# Patient Record
Sex: FEMALE | Race: WHITE | NOT HISPANIC OR LATINO | Employment: UNEMPLOYED | URBAN - NONMETROPOLITAN AREA
[De-identification: names, ages, dates, MRNs, and addresses within clinical notes are randomized per-mention and may not be internally consistent; named-entity substitution may affect disease eponyms.]

---

## 2023-03-07 ENCOUNTER — OFFICE VISIT (OUTPATIENT)
Dept: MEDICAL GROUP | Facility: PHYSICIAN GROUP | Age: 58
End: 2023-03-07
Payer: COMMERCIAL

## 2023-03-07 VITALS
RESPIRATION RATE: 16 BRPM | TEMPERATURE: 96.8 F | SYSTOLIC BLOOD PRESSURE: 118 MMHG | WEIGHT: 101 LBS | DIASTOLIC BLOOD PRESSURE: 90 MMHG | OXYGEN SATURATION: 100 % | HEART RATE: 68 BPM

## 2023-03-07 DIAGNOSIS — R41.3 MEMORY DISORDER: ICD-10-CM

## 2023-03-07 DIAGNOSIS — F99 INSOMNIA DUE TO OTHER MENTAL DISORDER: ICD-10-CM

## 2023-03-07 DIAGNOSIS — F51.05 INSOMNIA DUE TO OTHER MENTAL DISORDER: ICD-10-CM

## 2023-03-07 DIAGNOSIS — Z12.11 SCREENING FOR COLORECTAL CANCER: ICD-10-CM

## 2023-03-07 DIAGNOSIS — F33.1 MODERATE EPISODE OF RECURRENT MAJOR DEPRESSIVE DISORDER (HCC): ICD-10-CM

## 2023-03-07 DIAGNOSIS — Z00.00 ANNUAL PHYSICAL EXAM: ICD-10-CM

## 2023-03-07 DIAGNOSIS — B00.9 HERPES SIMPLEX: ICD-10-CM

## 2023-03-07 DIAGNOSIS — F33.41 MAJOR DEPRESSIVE DISORDER, RECURRENT EPISODE, IN PARTIAL REMISSION (HCC): ICD-10-CM

## 2023-03-07 DIAGNOSIS — F50.02 ANOREXIA NERVOSA, BINGE-EATING PURGING TYPE: ICD-10-CM

## 2023-03-07 DIAGNOSIS — Z12.12 SCREENING FOR COLORECTAL CANCER: ICD-10-CM

## 2023-03-07 PROBLEM — G47.00 INSOMNIA: Status: ACTIVE | Noted: 2023-03-07

## 2023-03-07 PROBLEM — F50.029 ANOREXIA NERVOSA, BINGE-EATING PURGING TYPE: Status: ACTIVE | Noted: 2021-12-27

## 2023-03-07 PROBLEM — F33.9 MAJOR DEPRESSIVE DISORDER, RECURRENT EPISODE (HCC): Status: ACTIVE | Noted: 2022-01-26

## 2023-03-07 PROCEDURE — 99204 OFFICE O/P NEW MOD 45 MIN: CPT | Performed by: NURSE PRACTITIONER

## 2023-03-07 RX ORDER — VENLAFAXINE HYDROCHLORIDE 150 MG/1
300 CAPSULE, EXTENDED RELEASE ORAL DAILY
Qty: 90 CAPSULE | Refills: 0 | Status: SHIPPED | OUTPATIENT
Start: 2023-03-07 | End: 2023-11-13 | Stop reason: SDUPTHER

## 2023-03-07 RX ORDER — GINSENG 100 MG
1 CAPSULE ORAL 2 TIMES DAILY
COMMUNITY
Start: 2022-03-09 | End: 2024-01-07

## 2023-03-07 RX ORDER — GABAPENTIN 600 MG/1
600 TABLET ORAL DAILY
Qty: 90 TABLET | Refills: 0 | Status: SHIPPED | OUTPATIENT
Start: 2023-03-07 | End: 2023-11-13 | Stop reason: SDUPTHER

## 2023-03-07 RX ORDER — CLONAZEPAM 1 MG/1
TABLET ORAL
COMMUNITY
Start: 2023-02-13 | End: 2023-11-13 | Stop reason: SDUPTHER

## 2023-03-07 RX ORDER — VENLAFAXINE HYDROCHLORIDE 150 MG/1
300 CAPSULE, EXTENDED RELEASE ORAL
COMMUNITY
Start: 2022-02-03 | End: 2023-03-07

## 2023-03-07 RX ORDER — GABAPENTIN 600 MG/1
1 TABLET ORAL 2 TIMES DAILY
COMMUNITY
Start: 2022-02-03 | End: 2023-03-07 | Stop reason: SDUPTHER

## 2023-03-07 RX ORDER — VENLAFAXINE HYDROCHLORIDE 150 MG/1
300 CAPSULE, EXTENDED RELEASE ORAL DAILY
COMMUNITY
Start: 2023-02-13 | End: 2023-03-07 | Stop reason: SDUPTHER

## 2023-03-07 RX ORDER — GABAPENTIN 600 MG/1
TABLET ORAL
COMMUNITY
Start: 2023-02-17 | End: 2023-03-07

## 2023-03-07 RX ORDER — MIRTAZAPINE 45 MG/1
45 TABLET, FILM COATED ORAL
COMMUNITY
Start: 2023-02-10 | End: 2023-11-13 | Stop reason: SDUPTHER

## 2023-03-07 RX ORDER — MIRTAZAPINE 45 MG/1
45 TABLET, FILM COATED ORAL
COMMUNITY
Start: 2022-02-03 | End: 2023-03-07

## 2023-03-07 ASSESSMENT — ENCOUNTER SYMPTOMS
VOMITING: 0
INSOMNIA: 1
FEVER: 0
MEMORY LOSS: 1
HEADACHES: 0
NERVOUS/ANXIOUS: 1
CHILLS: 0
DIZZINESS: 0
EYES NEGATIVE: 1
DEPRESSION: 1
SHORTNESS OF BREATH: 0
NAUSEA: 0

## 2023-03-07 ASSESSMENT — PATIENT HEALTH QUESTIONNAIRE - PHQ9
SUM OF ALL RESPONSES TO PHQ QUESTIONS 1-9: 13
5. POOR APPETITE OR OVEREATING: 0 - NOT AT ALL
CLINICAL INTERPRETATION OF PHQ2 SCORE: 6
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
## 2023-03-07 NOTE — ASSESSMENT & PLAN NOTE
Patient currently takes 600 mg gabapentin nightly, Remeron 45 mg nightly to help with her sleep.

## 2023-03-24 ENCOUNTER — OFFICE VISIT (OUTPATIENT)
Dept: MEDICAL GROUP | Facility: PHYSICIAN GROUP | Age: 58
End: 2023-03-24
Payer: COMMERCIAL

## 2023-03-24 VITALS
BODY MASS INDEX: 16.96 KG/M2 | OXYGEN SATURATION: 96 % | WEIGHT: 101.8 LBS | SYSTOLIC BLOOD PRESSURE: 112 MMHG | DIASTOLIC BLOOD PRESSURE: 60 MMHG | HEIGHT: 65 IN | RESPIRATION RATE: 12 BRPM | TEMPERATURE: 96.9 F | HEART RATE: 75 BPM

## 2023-03-24 DIAGNOSIS — B00.9 HERPES SIMPLEX: ICD-10-CM

## 2023-03-24 DIAGNOSIS — H54.7 VISION IMPAIRMENT: ICD-10-CM

## 2023-03-24 DIAGNOSIS — E55.9 VITAMIN D DEFICIENCY: ICD-10-CM

## 2023-03-24 DIAGNOSIS — F33.41 MAJOR DEPRESSIVE DISORDER, RECURRENT EPISODE, IN PARTIAL REMISSION (HCC): ICD-10-CM

## 2023-03-24 DIAGNOSIS — Z87.440 HISTORY OF RECURRENT UTIS: ICD-10-CM

## 2023-03-24 DIAGNOSIS — Z13.820 SCREENING FOR OSTEOPOROSIS: ICD-10-CM

## 2023-03-24 DIAGNOSIS — F33.0 MILD EPISODE OF RECURRENT MAJOR DEPRESSIVE DISORDER (HCC): ICD-10-CM

## 2023-03-24 DIAGNOSIS — F50.02 ANOREXIA NERVOSA, BINGE-EATING PURGING TYPE: ICD-10-CM

## 2023-03-24 PROCEDURE — 99214 OFFICE O/P EST MOD 30 MIN: CPT | Performed by: NURSE PRACTITIONER

## 2023-03-24 RX ORDER — ACYCLOVIR 400 MG/1
400 TABLET ORAL 2 TIMES DAILY
Qty: 90 TABLET | Refills: 0 | Status: SHIPPED | OUTPATIENT
Start: 2023-03-24 | End: 2024-01-07

## 2023-03-24 RX ORDER — GABAPENTIN 300 MG/1
CAPSULE ORAL
COMMUNITY
Start: 2023-03-15 | End: 2023-03-24

## 2023-03-27 ASSESSMENT — ENCOUNTER SYMPTOMS
FLANK PAIN: 0
FEVER: 0
SHORTNESS OF BREATH: 0
CHILLS: 0

## 2023-03-27 NOTE — PROGRESS NOTES
"CC:  Chief Complaint   Patient presents with    Lab Results       HISTORY OF PRESENT ILLNESS: Patient is a 57 y.o. female established patient presenting     Problem   History of Recurrent Utis    Gets after sex often  Notes she is compliant with hygiene after intercourse but continues to get them         Vitamin D Deficiency    New labs completed 3/21/2023   Vitamin d level 16.4  Not currently on any supplementation      Anorexia Nervosa, Binge-Eating Purging Type    Requesting new referral for behavioral health. She is still following with her psych but would like someone closer.     3/7/2023  History of since she was 16 yo  Has been in and out of eating disorder clinics  Is physically active 5 times a week  Notes she purges 7 nights a week but will eat after purging           Review of Systems   Constitutional:  Negative for chills and fever.   Eyes:         Sometimes get blurred vision or out of focus with certain distances   Respiratory:  Negative for shortness of breath.    Cardiovascular:  Negative for chest pain.   Genitourinary:  Negative for dysuria, flank pain, frequency, hematuria and urgency.           - NOTE: All other systems reviewed and are negative, except as in HPI.      Exam:    /60 (BP Location: Left arm, Patient Position: Sitting, BP Cuff Size: Adult)   Pulse 75   Temp 36.1 °C (96.9 °F) (Temporal)   Resp 12   Ht 1.651 m (5' 5\")   Wt 46.2 kg (101 lb 12.8 oz)   SpO2 96%  Body mass index is 16.94 kg/m².    Physical Exam  Vitals and nursing note reviewed.   Constitutional:       General: She is not in acute distress.     Appearance: Normal appearance. She is not ill-appearing.   HENT:      Head: Normocephalic and atraumatic.   Pulmonary:      Effort: Pulmonary effort is normal.   Neurological:      General: No focal deficit present.      Mental Status: She is alert.   Psychiatric:         Mood and Affect: Mood normal.         Behavior: Behavior normal.         Thought Content: Thought " content normal.         Judgment: Judgment normal.     Labs from 3/21/2023 reviewed with patient    Assessment/Plan:    1. Herpes simplex  Chronic and stable condition   - acyclovir (ZOVIRAX) 400 MG tablet; Take 1 Tablet by mouth 2 times a day.  Dispense: 90 Tablet; Refill: 0    2. Vision impairment  Chronic and stable condition   - Referral to Ophthalmology    3. History of recurrent UTIs  Chronic and stable condition   No complaints at this time     4. Screening for osteoporosis  - DS-BONE DENSITY STUDY (DEXA); Future    5. Anorexia nervosa, binge-eating purging type  Chronic and exacerbated condition  - DS-BONE DENSITY STUDY (DEXA); Future  - Referral to Behavioral Health    6. Mild episode of recurrent major depressive disorder (HCC)  Chronic and stable condition   - Referral to Behavioral Health    7. Major depressive disorder, recurrent episode, in partial remission (HCC)  Chronic and stable condition  - Referral to Behavioral Health    8. Vitamin D deficiency  Chronic and stable condition   Would prefer OTC vitamin supplementation        Discussed with patient possible alternative diagnoses, patient is to take all medications as prescribed.     If symptoms persist FU w/PCP, if symptoms worsen go to emergency room.     If experiencing any side effects from prescribed medications reports to the office immediately or go to emergency room.    Reviewed indication, dosage, usage and potential adverse effects of prescribed medications.     Reviewed risks and benefits of treatment plan. Patient verbalizes understanding of all instruction and verbally agrees to plan.      Return in about 6 months (around 9/24/2023).      Please note that this dictation was created using voice recognition software. I have made every reasonable attempt to correct obvious errors, but I expect that there are errors of grammar and possibly content that I did not discover before finalizing the note.

## 2023-04-26 ENCOUNTER — OFFICE VISIT (OUTPATIENT)
Dept: MEDICAL GROUP | Facility: PHYSICIAN GROUP | Age: 58
End: 2023-04-26
Payer: COMMERCIAL

## 2023-04-26 VITALS
RESPIRATION RATE: 14 BRPM | DIASTOLIC BLOOD PRESSURE: 68 MMHG | HEART RATE: 74 BPM | SYSTOLIC BLOOD PRESSURE: 110 MMHG | OXYGEN SATURATION: 98 % | BODY MASS INDEX: 16.36 KG/M2 | HEIGHT: 65 IN | WEIGHT: 98.2 LBS | TEMPERATURE: 97.6 F

## 2023-04-26 DIAGNOSIS — F50.02 ANOREXIA NERVOSA, BINGE-EATING PURGING TYPE: ICD-10-CM

## 2023-04-26 DIAGNOSIS — F33.41 MAJOR DEPRESSIVE DISORDER, RECURRENT EPISODE, IN PARTIAL REMISSION (HCC): ICD-10-CM

## 2023-04-26 DIAGNOSIS — F33.2 SEVERE EPISODE OF RECURRENT MAJOR DEPRESSIVE DISORDER, WITHOUT PSYCHOTIC FEATURES (HCC): ICD-10-CM

## 2023-04-26 PROCEDURE — 99213 OFFICE O/P EST LOW 20 MIN: CPT | Performed by: NURSE PRACTITIONER

## 2023-04-26 RX ORDER — GABAPENTIN 300 MG/1
CAPSULE ORAL
COMMUNITY
Start: 2023-04-11 | End: 2023-11-13 | Stop reason: SDUPTHER

## 2023-04-26 ASSESSMENT — ENCOUNTER SYMPTOMS
DEPRESSION: 1
SHORTNESS OF BREATH: 0
WEAKNESS: 0
CHILLS: 0
WEIGHT LOSS: 1
DIZZINESS: 0
FEVER: 0
NERVOUS/ANXIOUS: 1

## 2023-04-27 NOTE — PROGRESS NOTES
"CC:  Chief Complaint   Patient presents with    Blood Pressure Problem     low    Dehydration       HISTORY OF PRESENT ILLNESS: Patient is a 57 y.o. female established patient presenting     Problem   Anorexia Nervosa, Binge-Eating Purging Type    Requesting new referral to behavioral health as the referral that was sent she was told they were not going to treat her  Recently lost her dad and notes she has been purging more and notes she is a little dehydrated  States she was seen by her psychiatrist yesterday and told that she either needed to be in the hospital or follow up with me in 24 hours  Notes she has been drinking 1 nutritional drink a day but notes she should drink more  Called her psychiatrist during visit to let them know she was here      3/24/2023  Requesting new referral for behavioral health. She is still following with her psych but would like someone closer.     3/7/2023  History of since she was 16 yo  Has been in and out of eating disorder clinics  Is physically active 5 times a week  Notes she purges 7 nights a week but will eat after purging           Review of Systems   Constitutional:  Positive for weight loss. Negative for chills and fever.   Respiratory:  Negative for shortness of breath.    Cardiovascular:  Negative for chest pain.   Neurological:  Negative for dizziness and weakness.   Psychiatric/Behavioral:  Positive for depression. Negative for suicidal ideas. The patient is nervous/anxious.            - NOTE: All other systems reviewed and are negative, except as in HPI.      Exam:    /68 (BP Location: Left arm, Patient Position: Sitting, BP Cuff Size: Adult)   Pulse 74   Temp 36.4 °C (97.6 °F) (Temporal)   Resp 14   Ht 1.651 m (5' 5\")   Wt 44.5 kg (98 lb 3.2 oz)   SpO2 98%  Body mass index is 16.34 kg/m².    Physical Exam  HENT:      Head: Normocephalic and atraumatic.   Cardiovascular:      Rate and Rhythm: Normal rate and regular rhythm.      Pulses: Normal pulses.     "  Heart sounds: Normal heart sounds.   Pulmonary:      Effort: Pulmonary effort is normal.      Breath sounds: Normal breath sounds.   Musculoskeletal:         General: Normal range of motion.      Cervical back: Normal range of motion.   Skin:     General: Skin is warm and dry.   Neurological:      Mental Status: She is alert.   Psychiatric:         Mood and Affect: Mood normal. Affect is blunt.         Speech: Speech normal.         Behavior: Behavior normal. Behavior is cooperative.         Thought Content: Thought content normal.         Assessment/Plan:    1. Anorexia nervosa, binge-eating purging type  Chronic exacerbated condition  - Referral to Behavioral Health  - Referral to Psychology  2. Severe episode of recurrent major depressive disorder, without psychotic features (HCC)  Chronic exacerbated condition  - Referral to Behavioral Health  - Referral to Psychology  3. Major depressive disorder, recurrent episode, in partial remission (HCC)  Chronic exacerbated condition  - Referral to Behavioral Health  - Referral to Psychology  After extensive conversation with patient psychiatrist as well patient patient states that she is going to go to [facility] on Friday after she figures out what to do with her animals.  At this time I do not feel that she is a danger to herself or to a wedding.  We did discuss at length to continue her taking a nutritional drink and increasing it for 1-2 times a day as well as staying hydrated with Pedialyte or sugar-free Gatorade which she is agreeable to.  We will follow-up with patient on Thursday or Friday to reevaluate how she is doing.  Patient also discussed that she would not be purging today and plans to continue not purging at this time       Discussed with patient possible alternative diagnoses, patient is to take all medications as prescribed.     If symptoms persist FU w/PCP, if symptoms worsen go to emergency room.     If experiencing any side effects from  prescribed medications reports to the office immediately or go to emergency room.    Reviewed indication, dosage, usage and potential adverse effects of prescribed medications.     Reviewed risks and benefits of treatment plan. Patient verbalizes understanding of all instruction and verbally agrees to plan.      Return in about 1 week (around 5/3/2023).      Please note that this dictation was created using voice recognition software. I have made every reasonable attempt to correct obvious errors, but I expect that there are errors of grammar and possibly content that I did not discover before finalizing the note.

## 2023-05-08 ENCOUNTER — APPOINTMENT (OUTPATIENT)
Dept: RADIOLOGY | Facility: MEDICAL CENTER | Age: 58
End: 2023-05-08
Attending: NURSE PRACTITIONER
Payer: COMMERCIAL

## 2023-07-11 ENCOUNTER — OFFICE VISIT (OUTPATIENT)
Dept: MEDICAL GROUP | Facility: PHYSICIAN GROUP | Age: 58
End: 2023-07-11
Payer: COMMERCIAL

## 2023-07-11 VITALS
WEIGHT: 99.2 LBS | TEMPERATURE: 96.8 F | OXYGEN SATURATION: 98 % | DIASTOLIC BLOOD PRESSURE: 72 MMHG | BODY MASS INDEX: 16.53 KG/M2 | SYSTOLIC BLOOD PRESSURE: 116 MMHG | HEART RATE: 78 BPM | HEIGHT: 65 IN | RESPIRATION RATE: 12 BRPM

## 2023-07-11 DIAGNOSIS — F50.89 BINGE-PURGE BEHAVIOR: ICD-10-CM

## 2023-07-11 DIAGNOSIS — F50.02 ANOREXIA NERVOSA, BINGE-EATING PURGING TYPE: ICD-10-CM

## 2023-07-11 PROCEDURE — 93000 ELECTROCARDIOGRAM COMPLETE: CPT | Performed by: NURSE PRACTITIONER

## 2023-07-11 PROCEDURE — 99214 OFFICE O/P EST MOD 30 MIN: CPT | Performed by: NURSE PRACTITIONER

## 2023-07-11 PROCEDURE — 3078F DIAST BP <80 MM HG: CPT | Performed by: NURSE PRACTITIONER

## 2023-07-11 PROCEDURE — 3074F SYST BP LT 130 MM HG: CPT | Performed by: NURSE PRACTITIONER

## 2023-07-11 RX ORDER — GINSENG 100 MG
1 CAPSULE ORAL 2 TIMES DAILY
COMMUNITY
Start: 2022-03-09 | End: 2023-09-27

## 2023-07-11 ASSESSMENT — ENCOUNTER SYMPTOMS
CHILLS: 0
FEVER: 0
DEPRESSION: 1
SHORTNESS OF BREATH: 0
WEIGHT LOSS: 0
NERVOUS/ANXIOUS: 1

## 2023-07-11 ASSESSMENT — LIFESTYLE VARIABLES: SUBSTANCE_ABUSE: 0

## 2023-07-11 NOTE — PROGRESS NOTES
"CC:  Chief Complaint   Patient presents with    Paperwork    Requesting Labs       HISTORY OF PRESENT ILLNESS: Patient is a 57 y.o. female established patient presenting     Problem   Anorexia Nervosa, Binge-Eating Purging Type    Patient in office today to discuss eating disorder clinic   They have requested labs and EKG to be completed  Patient with lab slip today   EKG to be completed in office today     4/26/2023  Requesting new referral to behavioral health   Recently lost her dad and notes she has been purging more and notes she is a little dehydrated  States she was seen by her psychiatrist yesterday and told that she either needed to be in the hospital or follow up with me in 24 hours  Notes she has been drinking 1 nutritional drink a day but notes she should drink more  Called her psychiatrist during visit to let them know she was here      3/24/2023  Requesting new referral for behavioral health. She is still following with her psych but would like someone closer.     3/7/2023  History of since she was 14 yo  Has been in and out of eating disorder clinics  Is physically active 5 times a week  Notes she purges 7 nights a week but will eat after purging           Review of Systems   Constitutional:  Negative for chills, fever and weight loss.   Respiratory:  Negative for shortness of breath.    Cardiovascular:  Negative for chest pain.   Psychiatric/Behavioral:  Positive for depression. Negative for substance abuse and suicidal ideas. The patient is nervous/anxious.              - NOTE: All other systems reviewed and are negative, except as in HPI.      Exam:    /72 (BP Location: Right arm, Patient Position: Standing, BP Cuff Size: Small adult)   Pulse 78   Temp 36 °C (96.8 °F) (Temporal)   Resp 12   Ht 1.638 m (5' 4.5\")   Wt 45 kg (99 lb 3.2 oz)   SpO2 98%  Body mass index is 16.76 kg/m².    Physical Exam  Constitutional:       General: She is not in acute distress.     Appearance: She is " ill-appearing.   HENT:      Head: Normocephalic and atraumatic.   Pulmonary:      Effort: Pulmonary effort is normal.   Musculoskeletal:         General: Normal range of motion.      Cervical back: Normal range of motion.   Neurological:      Mental Status: She is alert and oriented to person, place, and time.   Psychiatric:         Behavior: Behavior normal.           Assessment/Plan:    1. Binge-purge behavior  Chronic and exacerbated condition   - EKG - Clinic Performed    2. Anorexia nervosa, binge-eating purging type  Chronic and exacerbated condition   - EKG - Clinic Performed       Discussed with patient possible alternative diagnoses, patient is to take all medications as prescribed.     If symptoms persist FU w/PCP, if symptoms worsen go to emergency room.     If experiencing any side effects from prescribed medications reports to the office immediately or go to emergency room.    Reviewed indication, dosage, usage and potential adverse effects of prescribed medications.     Reviewed risks and benefits of treatment plan. Patient verbalizes understanding of all instruction and verbally agrees to plan.      Return for as needed.      Please note that this dictation was created using voice recognition software. I have made every reasonable attempt to correct obvious errors, but I expect that there are errors of grammar and possibly content that I did not discover before finalizing the note.

## 2023-09-27 ENCOUNTER — PATIENT OUTREACH (OUTPATIENT)
Dept: HEALTH INFORMATION MANAGEMENT | Facility: OTHER | Age: 58
End: 2023-09-27

## 2023-09-27 ENCOUNTER — OFFICE VISIT (OUTPATIENT)
Dept: MEDICAL GROUP | Facility: PHYSICIAN GROUP | Age: 58
End: 2023-09-27
Payer: COMMERCIAL

## 2023-09-27 VITALS
HEIGHT: 64 IN | SYSTOLIC BLOOD PRESSURE: 128 MMHG | WEIGHT: 101.41 LBS | DIASTOLIC BLOOD PRESSURE: 84 MMHG | BODY MASS INDEX: 17.31 KG/M2 | OXYGEN SATURATION: 99 % | HEART RATE: 83 BPM | TEMPERATURE: 97.7 F | RESPIRATION RATE: 12 BRPM

## 2023-09-27 DIAGNOSIS — F50.02 ANOREXIA NERVOSA, BINGE-EATING PURGING TYPE: ICD-10-CM

## 2023-09-27 DIAGNOSIS — F33.2 SEVERE EPISODE OF RECURRENT MAJOR DEPRESSIVE DISORDER, WITHOUT PSYCHOTIC FEATURES (HCC): ICD-10-CM

## 2023-09-27 DIAGNOSIS — Z65.4 STALKING VICTIM: ICD-10-CM

## 2023-09-27 DIAGNOSIS — F50.89 BINGE-PURGE BEHAVIOR: ICD-10-CM

## 2023-09-27 PROCEDURE — 3079F DIAST BP 80-89 MM HG: CPT | Performed by: STUDENT IN AN ORGANIZED HEALTH CARE EDUCATION/TRAINING PROGRAM

## 2023-09-27 PROCEDURE — 99213 OFFICE O/P EST LOW 20 MIN: CPT | Performed by: STUDENT IN AN ORGANIZED HEALTH CARE EDUCATION/TRAINING PROGRAM

## 2023-09-27 PROCEDURE — 3074F SYST BP LT 130 MM HG: CPT | Performed by: STUDENT IN AN ORGANIZED HEALTH CARE EDUCATION/TRAINING PROGRAM

## 2023-09-27 RX ORDER — MIRTAZAPINE 45 MG/1
1 TABLET, FILM COATED ORAL
COMMUNITY
Start: 2022-02-03 | End: 2023-09-27
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
## 2023-10-04 ENCOUNTER — OFFICE VISIT (OUTPATIENT)
Dept: MEDICAL GROUP | Facility: PHYSICIAN GROUP | Age: 58
End: 2023-10-04
Payer: COMMERCIAL

## 2023-10-04 VITALS
TEMPERATURE: 97 F | SYSTOLIC BLOOD PRESSURE: 98 MMHG | BODY MASS INDEX: 17.45 KG/M2 | HEART RATE: 77 BPM | OXYGEN SATURATION: 96 % | HEIGHT: 65 IN | WEIGHT: 104.72 LBS | RESPIRATION RATE: 14 BRPM | DIASTOLIC BLOOD PRESSURE: 74 MMHG

## 2023-10-04 DIAGNOSIS — R30.0 DYSURIA: ICD-10-CM

## 2023-10-04 LAB
APPEARANCE UR: CLEAR
BILIRUB UR STRIP-MCNC: ABNORMAL MG/DL
COLOR UR AUTO: YELLOW
GLUCOSE UR STRIP.AUTO-MCNC: ABNORMAL MG/DL
KETONES UR STRIP.AUTO-MCNC: ABNORMAL MG/DL
LEUKOCYTE ESTERASE UR QL STRIP.AUTO: ABNORMAL
NITRITE UR QL STRIP.AUTO: ABNORMAL
PH UR STRIP.AUTO: 8.5 [PH] (ref 5–8)
PROT UR QL STRIP: ABNORMAL MG/DL
RBC UR QL AUTO: ABNORMAL
SP GR UR STRIP.AUTO: 1.02
UROBILINOGEN UR STRIP-MCNC: 0.2 MG/DL

## 2023-10-04 PROCEDURE — 3078F DIAST BP <80 MM HG: CPT | Performed by: STUDENT IN AN ORGANIZED HEALTH CARE EDUCATION/TRAINING PROGRAM

## 2023-10-04 PROCEDURE — 3074F SYST BP LT 130 MM HG: CPT | Performed by: STUDENT IN AN ORGANIZED HEALTH CARE EDUCATION/TRAINING PROGRAM

## 2023-10-04 PROCEDURE — 99213 OFFICE O/P EST LOW 20 MIN: CPT | Performed by: STUDENT IN AN ORGANIZED HEALTH CARE EDUCATION/TRAINING PROGRAM

## 2023-10-04 PROCEDURE — 81002 URINALYSIS NONAUTO W/O SCOPE: CPT | Performed by: STUDENT IN AN ORGANIZED HEALTH CARE EDUCATION/TRAINING PROGRAM

## 2023-10-04 RX ORDER — AMOXICILLIN AND CLAVULANATE POTASSIUM 500; 125 MG/1; MG/1
1 TABLET, FILM COATED ORAL 2 TIMES DAILY
Qty: 10 TABLET | Refills: 0 | Status: SHIPPED | OUTPATIENT
Start: 2023-10-04 | End: 2023-10-09

## 2023-10-05 NOTE — ASSESSMENT & PLAN NOTE
ua neg for luek esterase, neg for nitrites  I suspected taking the augmentin this morning may be clearing the infection. Will cont rx for 5 days.
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
## 2023-11-13 ENCOUNTER — OFFICE VISIT (OUTPATIENT)
Dept: BEHAVIORAL HEALTH | Facility: PSYCHIATRIC FACILITY | Age: 58
End: 2023-11-13
Payer: COMMERCIAL

## 2023-11-13 VITALS
SYSTOLIC BLOOD PRESSURE: 106 MMHG | HEIGHT: 65 IN | BODY MASS INDEX: 16.66 KG/M2 | DIASTOLIC BLOOD PRESSURE: 72 MMHG | WEIGHT: 100 LBS | HEART RATE: 87 BPM | OXYGEN SATURATION: 85 %

## 2023-11-13 DIAGNOSIS — F41.9 ANXIETY DISORDER, UNSPECIFIED TYPE: ICD-10-CM

## 2023-11-13 DIAGNOSIS — F50.02 ANOREXIA NERVOSA, BINGE-EATING PURGING TYPE: ICD-10-CM

## 2023-11-13 DIAGNOSIS — F32.A DEPRESSIVE DISORDER: ICD-10-CM

## 2023-11-13 DIAGNOSIS — F51.05 INSOMNIA DUE TO OTHER MENTAL DISORDER: ICD-10-CM

## 2023-11-13 DIAGNOSIS — F99 INSOMNIA DUE TO OTHER MENTAL DISORDER: ICD-10-CM

## 2023-11-13 PROCEDURE — 90792 PSYCH DIAG EVAL W/MED SRVCS: CPT | Mod: GC

## 2023-11-13 PROCEDURE — 3074F SYST BP LT 130 MM HG: CPT

## 2023-11-13 PROCEDURE — 3078F DIAST BP <80 MM HG: CPT

## 2023-11-13 RX ORDER — CLONAZEPAM 1 MG/1
TABLET ORAL
Qty: 30 TABLET | Refills: 0 | Status: SHIPPED | OUTPATIENT
Start: 2023-11-13 | End: 2023-12-08 | Stop reason: SDUPTHER

## 2023-11-13 RX ORDER — GABAPENTIN 300 MG/1
300 CAPSULE ORAL
Qty: 60 CAPSULE | Refills: 0 | Status: SHIPPED | OUTPATIENT
Start: 2023-11-13 | End: 2024-01-12

## 2023-11-13 RX ORDER — MIRTAZAPINE 45 MG/1
45 TABLET, FILM COATED ORAL
Qty: 30 TABLET | Refills: 3 | Status: SHIPPED | OUTPATIENT
Start: 2023-11-13 | End: 2024-01-15 | Stop reason: SDUPTHER

## 2023-11-13 RX ORDER — VENLAFAXINE HYDROCHLORIDE 150 MG/1
300 CAPSULE, EXTENDED RELEASE ORAL DAILY
Qty: 90 CAPSULE | Refills: 0 | Status: SHIPPED | OUTPATIENT
Start: 2023-11-13 | End: 2024-01-15 | Stop reason: SDUPTHER

## 2023-11-13 RX ORDER — GABAPENTIN 600 MG/1
600 TABLET ORAL DAILY
Qty: 60 TABLET | Refills: 0 | Status: SHIPPED | OUTPATIENT
Start: 2023-11-13 | End: 2024-01-12
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
## 2023-11-20 RX ORDER — CLONAZEPAM 1 MG/1
TABLET ORAL
Qty: 30 TABLET | Refills: 0 | OUTPATIENT
Start: 2023-11-20 | End: 2023-12-08

## 2023-12-04 ENCOUNTER — APPOINTMENT (OUTPATIENT)
Dept: BEHAVIORAL HEALTH | Facility: PSYCHIATRIC FACILITY | Age: 58
End: 2023-12-04

## 2023-12-08 RX ORDER — CLONAZEPAM 1 MG/1
1 TABLET ORAL
Qty: 30 TABLET | Refills: 0 | Status: SHIPPED | OUTPATIENT
Start: 2023-12-08 | End: 2024-01-07

## 2024-01-06 ENCOUNTER — HOSPITAL ENCOUNTER (OUTPATIENT)
Dept: RADIOLOGY | Facility: MEDICAL CENTER | Age: 59
End: 2024-01-06
Payer: COMMERCIAL

## 2024-01-06 ENCOUNTER — APPOINTMENT (OUTPATIENT)
Dept: RADIOLOGY | Facility: MEDICAL CENTER | Age: 59
DRG: 315 | End: 2024-01-06
Attending: EMERGENCY MEDICINE
Payer: COMMERCIAL

## 2024-01-06 ENCOUNTER — HOSPITAL ENCOUNTER (INPATIENT)
Facility: MEDICAL CENTER | Age: 59
LOS: 1 days | DRG: 315 | End: 2024-01-07
Attending: EMERGENCY MEDICINE | Admitting: STUDENT IN AN ORGANIZED HEALTH CARE EDUCATION/TRAINING PROGRAM
Payer: COMMERCIAL

## 2024-01-06 DIAGNOSIS — R07.9 ACUTE CHEST PAIN: ICD-10-CM

## 2024-01-06 DIAGNOSIS — R79.89 ELEVATED TROPONIN: ICD-10-CM
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
## 2024-01-06 LAB
ABO + RH BLD: NORMAL
ABO GROUP BLD: NORMAL
ALBUMIN SERPL BCP-MCNC: 4.2 G/DL (ref 3.2–4.9)
ALBUMIN/GLOB SERPL: 2.3 G/DL
ALP SERPL-CCNC: 60 U/L (ref 30–99)
ALT SERPL-CCNC: 19 U/L (ref 2–50)
ANION GAP SERPL CALC-SCNC: 11 MMOL/L (ref 7–16)
AST SERPL-CCNC: 38 U/L (ref 12–45)
BASOPHILS # BLD AUTO: 0.7 % (ref 0–1.8)
BASOPHILS # BLD: 0.06 K/UL (ref 0–0.12)
BILIRUB SERPL-MCNC: 0.4 MG/DL (ref 0.1–1.5)
BLD GP AB SCN SERPL QL: NORMAL
BUN SERPL-MCNC: 16 MG/DL (ref 8–22)
CALCIUM ALBUM COR SERPL-MCNC: 8.9 MG/DL (ref 8.5–10.5)
CALCIUM SERPL-MCNC: 9.1 MG/DL (ref 8.5–10.5)
CHLORIDE SERPL-SCNC: 103 MMOL/L (ref 96–112)
CO2 SERPL-SCNC: 26 MMOL/L (ref 20–33)
CREAT SERPL-MCNC: 0.79 MG/DL (ref 0.5–1.4)
EOSINOPHIL # BLD AUTO: 0.01 K/UL (ref 0–0.51)
EOSINOPHIL NFR BLD: 0.1 % (ref 0–6.9)
ERYTHROCYTE [DISTWIDTH] IN BLOOD BY AUTOMATED COUNT: 40.9 FL (ref 35.9–50)
ETHANOL BLD-MCNC: <10.1 MG/DL
GFR SERPLBLD CREATININE-BSD FMLA CKD-EPI: 86 ML/MIN/1.73 M 2
GLOBULIN SER CALC-MCNC: 1.8 G/DL (ref 1.9–3.5)
GLUCOSE SERPL-MCNC: 94 MG/DL (ref 65–99)
HCG SERPL QL: NEGATIVE
HCT VFR BLD AUTO: 39.1 % (ref 37–47)
HGB BLD-MCNC: 13.3 G/DL (ref 12–16)
IMM GRANULOCYTES # BLD AUTO: 0.02 K/UL (ref 0–0.11)
IMM GRANULOCYTES NFR BLD AUTO: 0.2 % (ref 0–0.9)
LIPASE SERPL-CCNC: 37 U/L (ref 11–82)
LYMPHOCYTES # BLD AUTO: 1.82 K/UL (ref 1–4.8)
LYMPHOCYTES NFR BLD: 20.7 % (ref 22–41)
MCH RBC QN AUTO: 30.4 PG (ref 27–33)
MCHC RBC AUTO-ENTMCNC: 34 G/DL (ref 32.2–35.5)
MCV RBC AUTO: 89.3 FL (ref 81.4–97.8)
MONOCYTES # BLD AUTO: 0.53 K/UL (ref 0–0.85)
MONOCYTES NFR BLD AUTO: 6 % (ref 0–13.4)
NEUTROPHILS # BLD AUTO: 6.37 K/UL (ref 1.82–7.42)
NEUTROPHILS NFR BLD: 72.3 % (ref 44–72)
NRBC # BLD AUTO: 0 K/UL
NRBC BLD-RTO: 0 /100 WBC (ref 0–0.2)
PLATELET # BLD AUTO: 228 K/UL (ref 164–446)
PMV BLD AUTO: 9.1 FL (ref 9–12.9)
POTASSIUM SERPL-SCNC: 3.7 MMOL/L (ref 3.6–5.5)
PROT SERPL-MCNC: 6 G/DL (ref 6–8.2)
RBC # BLD AUTO: 4.38 M/UL (ref 4.2–5.4)
RH BLD: NORMAL
SODIUM SERPL-SCNC: 140 MMOL/L (ref 135–145)
TROPONIN T SERPL-MCNC: 236 NG/L (ref 6–19)
WBC # BLD AUTO: 8.8 K/UL (ref 4.8–10.8)

## 2024-01-06 PROCEDURE — 84484 ASSAY OF TROPONIN QUANT: CPT

## 2024-01-06 PROCEDURE — 96375 TX/PRO/DX INJ NEW DRUG ADDON: CPT

## 2024-01-06 PROCEDURE — 36415 COLL VENOUS BLD VENIPUNCTURE: CPT

## 2024-01-06 PROCEDURE — 306637 HCHG MISC ORTHO ITEM RC 0274

## 2024-01-06 PROCEDURE — 86901 BLOOD TYPING SEROLOGIC RH(D): CPT

## 2024-01-06 PROCEDURE — 71260 CT THORAX DX C+: CPT

## 2024-01-06 PROCEDURE — 99285 EMERGENCY DEPT VISIT HI MDM: CPT

## 2024-01-06 PROCEDURE — 86850 RBC ANTIBODY SCREEN: CPT

## 2024-01-06 PROCEDURE — 305948 HCHG GREEN TRAUMA ACT PRE-NOTIFY NO CC

## 2024-01-06 PROCEDURE — 84703 CHORIONIC GONADOTROPIN ASSAY: CPT

## 2024-01-06 PROCEDURE — 700111 HCHG RX REV CODE 636 W/ 250 OVERRIDE (IP): Mod: JZ | Performed by: EMERGENCY MEDICINE

## 2024-01-06 PROCEDURE — 700117 HCHG RX CONTRAST REV CODE 255: Performed by: EMERGENCY MEDICINE

## 2024-01-06 PROCEDURE — 80053 COMPREHEN METABOLIC PANEL: CPT

## 2024-01-06 PROCEDURE — 83690 ASSAY OF LIPASE: CPT

## 2024-01-06 PROCEDURE — 96374 THER/PROPH/DIAG INJ IV PUSH: CPT

## 2024-01-06 PROCEDURE — 93005 ELECTROCARDIOGRAM TRACING: CPT | Performed by: EMERGENCY MEDICINE

## 2024-01-06 PROCEDURE — 82077 ASSAY SPEC XCP UR&BREATH IA: CPT

## 2024-01-06 PROCEDURE — 86900 BLOOD TYPING SEROLOGIC ABO: CPT

## 2024-01-06 PROCEDURE — 85025 COMPLETE CBC W/AUTO DIFF WBC: CPT

## 2024-01-06 RX ORDER — ONDANSETRON 2 MG/ML
4 INJECTION INTRAMUSCULAR; INTRAVENOUS ONCE
Status: COMPLETED | OUTPATIENT
Start: 2024-01-06 | End: 2024-01-06

## 2024-01-06 RX ADMIN — ONDANSETRON 4 MG: 2 INJECTION INTRAMUSCULAR; INTRAVENOUS at 23:00

## 2024-01-06 RX ADMIN — IOHEXOL 100 ML: 350 INJECTION, SOLUTION INTRAVENOUS at 22:53

## 2024-01-06 ASSESSMENT — PAIN DESCRIPTION - PAIN TYPE: TYPE: ACUTE PAIN

## 2024-01-07 ENCOUNTER — APPOINTMENT (OUTPATIENT)
Dept: CARDIOLOGY | Facility: MEDICAL CENTER | Age: 59
DRG: 315 | End: 2024-01-07
Attending: STUDENT IN AN ORGANIZED HEALTH CARE EDUCATION/TRAINING PROGRAM
Payer: COMMERCIAL

## 2024-01-07 VITALS
BODY MASS INDEX: 16.64 KG/M2 | TEMPERATURE: 98.1 F | DIASTOLIC BLOOD PRESSURE: 60 MMHG | OXYGEN SATURATION: 96 % | SYSTOLIC BLOOD PRESSURE: 111 MMHG | WEIGHT: 100 LBS | RESPIRATION RATE: 13 BRPM | HEART RATE: 72 BPM

## 2024-01-07 PROBLEM — I71.21 ASCENDING AORTIC ANEURYSM (HCC): Status: ACTIVE | Noted: 2024-01-07

## 2024-01-07 LAB
ANION GAP SERPL CALC-SCNC: 14 MMOL/L (ref 7–16)
BUN SERPL-MCNC: 17 MG/DL (ref 8–22)
CALCIUM SERPL-MCNC: 9.4 MG/DL (ref 8.5–10.5)
CHLORIDE SERPL-SCNC: 101 MMOL/L (ref 96–112)
CO2 SERPL-SCNC: 23 MMOL/L (ref 20–33)
CREAT SERPL-MCNC: 0.8 MG/DL (ref 0.5–1.4)
EKG IMPRESSION: NORMAL
EKG IMPRESSION: NORMAL
GFR SERPLBLD CREATININE-BSD FMLA CKD-EPI: 84 ML/MIN/1.73 M 2
GLUCOSE SERPL-MCNC: 84 MG/DL (ref 65–99)
LV EJECT FRACT MOD 2C 99903: 57.42
LV EJECT FRACT MOD 4C 99902: 66.38
LV EJECT FRACT MOD BP 99901: 62.47
MAGNESIUM SERPL-MCNC: 2 MG/DL (ref 1.5–2.5)
PHOSPHATE SERPL-MCNC: 3.8 MG/DL (ref 2.5–4.5)
POTASSIUM SERPL-SCNC: 4.1 MMOL/L (ref 3.6–5.5)
SODIUM SERPL-SCNC: 138 MMOL/L (ref 135–145)
TROPONIN T SERPL-MCNC: 188 NG/L (ref 6–19)
TROPONIN T SERPL-MCNC: 263 NG/L (ref 6–19)
TROPONIN T SERPL-MCNC: 82 NG/L (ref 6–19)

## 2024-01-07 PROCEDURE — 83735 ASSAY OF MAGNESIUM: CPT

## 2024-01-07 PROCEDURE — 96374 THER/PROPH/DIAG INJ IV PUSH: CPT

## 2024-01-07 PROCEDURE — 93010 ELECTROCARDIOGRAM REPORT: CPT | Mod: 26 | Performed by: STUDENT IN AN ORGANIZED HEALTH CARE EDUCATION/TRAINING PROGRAM

## 2024-01-07 PROCEDURE — 99285 EMERGENCY DEPT VISIT HI MDM: CPT

## 2024-01-07 PROCEDURE — 700111 HCHG RX REV CODE 636 W/ 250 OVERRIDE (IP)

## 2024-01-07 PROCEDURE — 96375 TX/PRO/DX INJ NEW DRUG ADDON: CPT

## 2024-01-07 PROCEDURE — 93005 ELECTROCARDIOGRAM TRACING: CPT | Performed by: STUDENT IN AN ORGANIZED HEALTH CARE EDUCATION/TRAINING PROGRAM

## 2024-01-07 PROCEDURE — 305948 HCHG GREEN TRAUMA ACT PRE-NOTIFY NO CC

## 2024-01-07 PROCEDURE — 93306 TTE W/DOPPLER COMPLETE: CPT | Mod: 26 | Performed by: STUDENT IN AN ORGANIZED HEALTH CARE EDUCATION/TRAINING PROGRAM

## 2024-01-07 PROCEDURE — 84100 ASSAY OF PHOSPHORUS: CPT

## 2024-01-07 PROCEDURE — 36415 COLL VENOUS BLD VENIPUNCTURE: CPT

## 2024-01-07 PROCEDURE — 99222 1ST HOSP IP/OBS MODERATE 55: CPT | Performed by: STUDENT IN AN ORGANIZED HEALTH CARE EDUCATION/TRAINING PROGRAM

## 2024-01-07 PROCEDURE — A9270 NON-COVERED ITEM OR SERVICE: HCPCS | Performed by: STUDENT IN AN ORGANIZED HEALTH CARE EDUCATION/TRAINING PROGRAM

## 2024-01-07 PROCEDURE — 700102 HCHG RX REV CODE 250 W/ 637 OVERRIDE(OP): Performed by: STUDENT IN AN ORGANIZED HEALTH CARE EDUCATION/TRAINING PROGRAM

## 2024-01-07 PROCEDURE — 99223 1ST HOSP IP/OBS HIGH 75: CPT | Mod: GC | Performed by: STUDENT IN AN ORGANIZED HEALTH CARE EDUCATION/TRAINING PROGRAM

## 2024-01-07 PROCEDURE — 84484 ASSAY OF TROPONIN QUANT: CPT | Mod: 91

## 2024-01-07 PROCEDURE — 99239 HOSP IP/OBS DSCHRG MGMT >30: CPT | Mod: DUPCHRG | Performed by: STUDENT IN AN ORGANIZED HEALTH CARE EDUCATION/TRAINING PROGRAM

## 2024-01-07 PROCEDURE — 80048 BASIC METABOLIC PNL TOTAL CA: CPT

## 2024-01-07 PROCEDURE — 93306 TTE W/DOPPLER COMPLETE: CPT

## 2024-01-07 RX ORDER — KETOROLAC TROMETHAMINE 30 MG/ML
15 INJECTION, SOLUTION INTRAMUSCULAR; INTRAVENOUS ONCE
Status: COMPLETED | OUTPATIENT
Start: 2024-01-07 | End: 2024-01-07

## 2024-01-07 RX ORDER — PROMETHAZINE HYDROCHLORIDE 25 MG/1
12.5-25 TABLET ORAL EVERY 4 HOURS PRN
Status: DISCONTINUED | OUTPATIENT
Start: 2024-01-07 | End: 2024-01-07 | Stop reason: HOSPADM

## 2024-01-07 RX ORDER — PROCHLORPERAZINE EDISYLATE 5 MG/ML
5-10 INJECTION INTRAMUSCULAR; INTRAVENOUS EVERY 4 HOURS PRN
Status: DISCONTINUED | OUTPATIENT
Start: 2024-01-07 | End: 2024-01-07 | Stop reason: HOSPADM

## 2024-01-07 RX ORDER — LABETALOL HYDROCHLORIDE 5 MG/ML
10 INJECTION, SOLUTION INTRAVENOUS EVERY 4 HOURS PRN
Status: DISCONTINUED | OUTPATIENT
Start: 2024-01-07 | End: 2024-01-07 | Stop reason: HOSPADM

## 2024-01-07 RX ORDER — AMOXICILLIN 250 MG
2 CAPSULE ORAL 2 TIMES DAILY
Status: DISCONTINUED | OUTPATIENT
Start: 2024-01-07 | End: 2024-01-07 | Stop reason: HOSPADM

## 2024-01-07 RX ORDER — ONDANSETRON 2 MG/ML
4 INJECTION INTRAMUSCULAR; INTRAVENOUS EVERY 4 HOURS PRN
Status: DISCONTINUED | OUTPATIENT
Start: 2024-01-07 | End: 2024-01-07 | Stop reason: HOSPADM

## 2024-01-07 RX ORDER — ACETAMINOPHEN 325 MG/1
650 TABLET ORAL EVERY 6 HOURS PRN
Status: DISCONTINUED | OUTPATIENT
Start: 2024-01-07 | End: 2024-01-07 | Stop reason: HOSPADM

## 2024-01-07 RX ORDER — POLYETHYLENE GLYCOL 3350 17 G/17G
1 POWDER, FOR SOLUTION ORAL
Status: DISCONTINUED | OUTPATIENT
Start: 2024-01-07 | End: 2024-01-07 | Stop reason: HOSPADM

## 2024-01-07 RX ORDER — MIRTAZAPINE 30 MG/1
45 TABLET, FILM COATED ORAL
Status: DISCONTINUED | OUTPATIENT
Start: 2024-01-07 | End: 2024-01-07 | Stop reason: HOSPADM

## 2024-01-07 RX ORDER — OXYCODONE HYDROCHLORIDE 5 MG/1
10 TABLET ORAL EVERY 4 HOURS PRN
Status: DISCONTINUED | OUTPATIENT
Start: 2024-01-07 | End: 2024-01-07 | Stop reason: HOSPADM

## 2024-01-07 RX ORDER — ONDANSETRON 4 MG/1
4 TABLET, ORALLY DISINTEGRATING ORAL EVERY 4 HOURS PRN
Status: DISCONTINUED | OUTPATIENT
Start: 2024-01-07 | End: 2024-01-07 | Stop reason: HOSPADM

## 2024-01-07 RX ORDER — OXYCODONE HYDROCHLORIDE 5 MG/1
5 TABLET ORAL EVERY 4 HOURS PRN
Status: DISCONTINUED | OUTPATIENT
Start: 2024-01-07 | End: 2024-01-07 | Stop reason: HOSPADM

## 2024-01-07 RX ORDER — VENLAFAXINE HYDROCHLORIDE 75 MG/1
300 CAPSULE, EXTENDED RELEASE ORAL
Status: DISCONTINUED | OUTPATIENT
Start: 2024-01-07 | End: 2024-01-07 | Stop reason: HOSPADM

## 2024-01-07 RX ORDER — CLONAZEPAM 1 MG/1
1 TABLET ORAL
Status: DISCONTINUED | OUTPATIENT
Start: 2024-01-07 | End: 2024-01-07 | Stop reason: HOSPADM

## 2024-01-07 RX ORDER — PROMETHAZINE HYDROCHLORIDE 25 MG/1
12.5-25 SUPPOSITORY RECTAL EVERY 4 HOURS PRN
Status: DISCONTINUED | OUTPATIENT
Start: 2024-01-07 | End: 2024-01-07 | Stop reason: HOSPADM

## 2024-01-07 RX ORDER — GABAPENTIN 300 MG/1
600 CAPSULE ORAL
Status: DISCONTINUED | OUTPATIENT
Start: 2024-01-07 | End: 2024-01-07 | Stop reason: HOSPADM

## 2024-01-07 RX ORDER — BISACODYL 10 MG
10 SUPPOSITORY, RECTAL RECTAL
Status: DISCONTINUED | OUTPATIENT
Start: 2024-01-07 | End: 2024-01-07 | Stop reason: HOSPADM

## 2024-01-07 RX ORDER — GABAPENTIN 300 MG/1
300 CAPSULE ORAL
Status: DISCONTINUED | OUTPATIENT
Start: 2024-01-07 | End: 2024-01-07 | Stop reason: HOSPADM

## 2024-01-07 RX ADMIN — MIRTAZAPINE 45 MG: 30 TABLET, FILM COATED ORAL at 02:16

## 2024-01-07 RX ADMIN — KETOROLAC TROMETHAMINE 15 MG: 30 INJECTION, SOLUTION INTRAMUSCULAR; INTRAVENOUS at 13:05

## 2024-01-07 RX ADMIN — GABAPENTIN 600 MG: 300 CAPSULE ORAL at 02:12

## 2024-01-07 RX ADMIN — ACETAMINOPHEN 650 MG: 325 TABLET, FILM COATED ORAL at 14:11

## 2024-01-07 RX ADMIN — ACETAMINOPHEN 650 MG: 325 TABLET, FILM COATED ORAL at 09:10

## 2024-01-07 ASSESSMENT — COPD QUESTIONNAIRES
HAVE YOU SMOKED AT LEAST 100 CIGARETTES IN YOUR ENTIRE LIFE: NO/DON'T KNOW
COPD SCREENING SCORE: 2
DO YOU EVER COUGH UP ANY MUCUS OR PHLEGM?: NO/ONLY WITH OCCASIONAL COLDS OR INFECTIONS
DURING THE PAST 4 WEEKS HOW MUCH DID YOU FEEL SHORT OF BREATH: NONE/LITTLE OF THE TIME
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
## 2024-01-07 NOTE — ASSESSMENT & PLAN NOTE
Incidentally noted on CT chest.  4.1 cm in diameter.  Patient notified of these findings.  Outpatient surveillance recommended.
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
## 2024-01-07 NOTE — ED NOTES
Melissa from Lab called with critical result of Troponin-236 at 2259. Critical lab result read back to Melissa.   Dr. Menezes notified of critical lab result at 2300.  Critical lab result read back by Dr. Menezes.

## 2024-01-07 NOTE — ED NOTES
Med rec updated and complete. Allergies reviewed. Confirmed name and date of birth.   Pt denies antibiotic use in last 30 days..  Pt denies anticoagulant and antiplatelet medications.    Home pharmacy [facility] = [phone]
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
## 2024-01-07 NOTE — ED NOTES
No change, continue to rest quietly with no acute distress noted.  Denied any new complaints. No current needs identified.  Gurney in low position, side rail up for pt safety. Call light within reach.
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
## 2024-01-07 NOTE — DISCHARGE PLANNING
WILLIAM arranged for transportation home via [employer] per RN request.  [employer] ride scheduled to  in 5 minutes- [employer] license plates [id number].  RN informed.

## 2024-01-07 NOTE — ED NOTES
Bedside report received from off going RN Marylou, assumed care of patient.  POC discussed with patient. Call light within reach, all needs addressed at this time.       Fall risk interventions in place: Move the patient closer to the nurse's station, Patient's personal possessions are with in their safe reach, and Keep floor surfaces clean and dry (all applicable per [facility] [place] risk assessment)   Continuous monitoring: Cardiac Leads, Pulse Ox, or Blood Pressure  IVF/IV medications: Not Applicable   Oxygen: Room Air  Bedside sitter: Not Applicable   Isolation: Not Applicable
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
## 2024-01-07 NOTE — ED NOTES
Pt states tylenol worked earlier for her headache.  Pt requesting another dose of tylenol.  ANJEL Osorio updated and orders received to give tylenol does an hour early for headache.

## 2024-01-07 NOTE — ED NOTES
Pt has discharge orders. Pt educated on discharge instructions.  Pt encouraged to follow up with PCP.  Pt verbalizes understanding. PIV removed. Pt ambulatory to lobby with steady gait. Pt going home with [employer].
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
## 2024-01-07 NOTE — ED NOTES
Report to KARTHIK Shaw. Pt resting on gurney with stable VS, SR on monitor, echo remains at bedside.

## 2024-01-07 NOTE — ED NOTES
Pt c/o diffuse chest wall pain of 5/10, unable to provide descriptor, but declines analgesia at this time. Echo at bedside

## 2024-01-07 NOTE — ED NOTES
Assumed patient care, bedside report received from KARTHIK Desir. Patient resting comfortably on gurney in NAD, repositioning self as needed. Respirations even and unlabored on RA. Continuous cardiac, pulse ox, and BP monitoring in place. SR on monitor.    Fall precautions in place including but not limited to: call light within reach, bed locked and in the lowest position, floors are clean and dry, patient's personal possessions are within their safe reach, nonskid socks/shoes on patient, appropriate sign in place.      POC discussed with patient, all needs addressed at this time.

## 2024-01-11 ENCOUNTER — APPOINTMENT (OUTPATIENT)
Dept: MEDICAL GROUP | Facility: PHYSICIAN GROUP | Age: 59
End: 2024-01-11
Payer: COMMERCIAL

## 2024-01-15 ENCOUNTER — OFFICE VISIT (OUTPATIENT)
Dept: BEHAVIORAL HEALTH | Facility: PSYCHIATRIC FACILITY | Age: 59
End: 2024-01-15
Payer: COMMERCIAL

## 2024-01-15 VITALS
WEIGHT: 97.4 LBS | HEART RATE: 60 BPM | OXYGEN SATURATION: 95 % | HEIGHT: 65 IN | DIASTOLIC BLOOD PRESSURE: 77 MMHG | SYSTOLIC BLOOD PRESSURE: 114 MMHG | BODY MASS INDEX: 16.23 KG/M2

## 2024-01-15 DIAGNOSIS — F50.02 ANOREXIA NERVOSA, BINGE-EATING PURGING TYPE: ICD-10-CM

## 2024-01-15 DIAGNOSIS — F99 INSOMNIA DUE TO OTHER MENTAL DISORDER: ICD-10-CM

## 2024-01-15 DIAGNOSIS — F51.05 INSOMNIA DUE TO OTHER MENTAL DISORDER: ICD-10-CM

## 2024-01-15 DIAGNOSIS — F41.9 ANXIETY DISORDER, UNSPECIFIED TYPE: ICD-10-CM

## 2024-01-15 PROCEDURE — 3078F DIAST BP <80 MM HG: CPT

## 2024-01-15 PROCEDURE — 3074F SYST BP LT 130 MM HG: CPT

## 2024-01-15 PROCEDURE — 99213 OFFICE O/P EST LOW 20 MIN: CPT | Mod: GE

## 2024-01-15 RX ORDER — CLONAZEPAM 1 MG/1
1 TABLET ORAL
Qty: 30 TABLET | Refills: 0 | Status: SHIPPED | OUTPATIENT
Start: 2024-01-15 | End: 2024-02-14

## 2024-01-15 RX ORDER — GABAPENTIN 300 MG/1
300 CAPSULE ORAL
Qty: 30 CAPSULE | Refills: 0 | Status: SHIPPED | OUTPATIENT
Start: 2024-01-15 | End: 2024-02-20

## 2024-01-15 RX ORDER — MIRTAZAPINE 45 MG/1
45 TABLET, FILM COATED ORAL
Qty: 30 TABLET | Refills: 3 | Status: SHIPPED | OUTPATIENT
Start: 2024-01-15

## 2024-01-15 RX ORDER — VENLAFAXINE HYDROCHLORIDE 150 MG/1
300 CAPSULE, EXTENDED RELEASE ORAL DAILY
Qty: 90 CAPSULE | Refills: 0 | Status: SHIPPED | OUTPATIENT
Start: 2024-01-15

## 2024-01-15 RX ORDER — GABAPENTIN 600 MG/1
600 TABLET ORAL
Qty: 30 TABLET | Refills: 0 | Status: SHIPPED | OUTPATIENT
Start: 2024-01-15 | End: 2024-02-14

## 2024-01-15 ASSESSMENT — FIBROSIS 4 INDEX: FIB4 SCORE: 2.294157338705617659

## 2024-01-15 ASSESSMENT — ENCOUNTER SYMPTOMS
MYALGIAS: 0
BLURRED VISION: 0
DEPRESSION: 1
NAUSEA: 0
HEADACHES: 1
BRUISES/BLEEDS EASILY: 0
DIZZINESS: 0
SHORTNESS OF BREATH: 0
WEIGHT LOSS: 1
DOUBLE VISION: 0
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
## 2024-01-17 ENCOUNTER — OFFICE VISIT (OUTPATIENT)
Dept: MEDICAL GROUP | Facility: PHYSICIAN GROUP | Age: 59
End: 2024-01-17
Payer: COMMERCIAL

## 2024-01-17 VITALS
RESPIRATION RATE: 18 BRPM | DIASTOLIC BLOOD PRESSURE: 76 MMHG | WEIGHT: 98.4 LBS | SYSTOLIC BLOOD PRESSURE: 106 MMHG | HEART RATE: 76 BPM | TEMPERATURE: 98.7 F | HEIGHT: 65 IN | OXYGEN SATURATION: 98 % | BODY MASS INDEX: 16.39 KG/M2

## 2024-01-17 DIAGNOSIS — Z65.4 STALKING VICTIM: ICD-10-CM
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
## 2024-01-17 DIAGNOSIS — R79.89 ELEVATED TROPONIN: ICD-10-CM

## 2024-01-17 DIAGNOSIS — I71.21 ANEURYSM OF ASCENDING AORTA WITHOUT RUPTURE (HCC): ICD-10-CM

## 2024-01-17 PROCEDURE — 99214 OFFICE O/P EST MOD 30 MIN: CPT | Performed by: NURSE PRACTITIONER

## 2024-01-17 PROCEDURE — 3078F DIAST BP <80 MM HG: CPT | Performed by: NURSE PRACTITIONER

## 2024-01-17 PROCEDURE — 3074F SYST BP LT 130 MM HG: CPT | Performed by: NURSE PRACTITIONER

## 2024-01-17 ASSESSMENT — ENCOUNTER SYMPTOMS
DIZZINESS: 0
FEVER: 0
SHORTNESS OF BREATH: 0
HALLUCINATIONS: 0
MYALGIAS: 1
CHILLS: 0
DEPRESSION: 1
WEIGHT LOSS: 0
PALPITATIONS: 0
NERVOUS/ANXIOUS: 1
HEADACHES: 0

## 2024-01-17 ASSESSMENT — FIBROSIS 4 INDEX: FIB4 SCORE: 2.294157338705617659

## 2024-01-17 ASSESSMENT — LIFESTYLE VARIABLES: SUBSTANCE_ABUSE: 0
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
## 2024-02-06 ENCOUNTER — TELEPHONE (OUTPATIENT)
Dept: HEALTH INFORMATION MANAGEMENT | Facility: OTHER | Age: 59
End: 2024-02-06
Payer: COMMERCIAL

## 2024-02-20 RX ORDER — GABAPENTIN 300 MG/1
CAPSULE ORAL
Qty: 30 CAPSULE | Refills: 0 | Status: SHIPPED | OUTPATIENT
Start: 2024-02-20

## 2024-03-01 ENCOUNTER — TELEPHONE (OUTPATIENT)
Dept: BEHAVIORAL HEALTH | Facility: PSYCHIATRIC FACILITY | Age: 59
End: 2024-03-01
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
## 2024-08-30 ENCOUNTER — OFFICE VISIT (OUTPATIENT)
Dept: MEDICAL GROUP | Facility: PHYSICIAN GROUP | Age: 59
End: 2024-08-30
Payer: COMMERCIAL

## 2024-08-30 VITALS
DIASTOLIC BLOOD PRESSURE: 88 MMHG | RESPIRATION RATE: 18 BRPM | HEART RATE: 85 BPM | WEIGHT: 101 LBS | OXYGEN SATURATION: 99 % | TEMPERATURE: 97.7 F | BODY MASS INDEX: 16.83 KG/M2 | HEIGHT: 65 IN | SYSTOLIC BLOOD PRESSURE: 110 MMHG

## 2024-08-30 DIAGNOSIS — F50.02 ANOREXIA NERVOSA, BINGE-EATING PURGING TYPE: ICD-10-CM

## 2024-08-30 DIAGNOSIS — Z13.820 SCREENING FOR OSTEOPOROSIS: ICD-10-CM

## 2024-08-30 DIAGNOSIS — E55.9 VITAMIN D DEFICIENCY: ICD-10-CM

## 2024-08-30 RX ORDER — LAMOTRIGINE 100 MG/1
TABLET ORAL
COMMUNITY
Start: 2024-06-07

## 2024-08-30 RX ORDER — CLONAZEPAM 1 MG/1
TABLET ORAL
COMMUNITY
Start: 2024-08-26

## 2024-08-30 RX ORDER — LAMOTRIGINE 100 MG/1
TABLET ORAL
COMMUNITY
Start: 2024-08-26

## 2024-08-30 ASSESSMENT — PATIENT HEALTH QUESTIONNAIRE - PHQ9
2. FEELING DOWN, DEPRESSED, IRRITABLE, OR HOPELESS: MORE THAN HALF THE DAYS
6. FEELING BAD ABOUT YOURSELF - OR THAT YOU ARE A FAILURE OR HAVE LET YOURSELF OR YOUR FAMILY DOWN: NEARLY EVERY DAY
SUM OF ALL RESPONSES TO PHQ QUESTIONS 1-9: 13
3. TROUBLE FALLING OR STAYING ASLEEP OR SLEEPING TOO MUCH: NEARLY EVERY DAY
8. MOVING OR SPEAKING SO SLOWLY THAT OTHER PEOPLE COULD HAVE NOTICED. OR THE OPPOSITE, BEING SO FIGETY OR RESTLESS THAT YOU HAVE BEEN MOVING AROUND A LOT MORE THAN USUAL: NOT AT ALL
7. TROUBLE CONCENTRATING ON THINGS, SUCH AS READING THE NEWSPAPER OR WATCHING TELEVISION: NEARLY EVERY DAY
SUM OF ALL RESPONSES TO PHQ9 QUESTIONS 1 AND 2: 4
9. THOUGHTS THAT YOU WOULD BE BETTER OFF DEAD, OR OF HURTING YOURSELF: NOT AT ALL
1. LITTLE INTEREST OR PLEASURE IN DOING THINGS: MORE THAN HALF THE DAYS
5. POOR APPETITE OR OVEREATING: NOT AT ALL
4. FEELING TIRED OR HAVING LITTLE ENERGY: NOT AT ALL

## 2024-08-30 ASSESSMENT — ENCOUNTER SYMPTOMS
SHORTNESS OF BREATH: 0
HEADACHES: 0
CHILLS: 0
MYALGIAS: 0
DIZZINESS: 0
NECK PAIN: 1
TINGLING: 0
WEIGHT LOSS: 0
FALLS: 0
BACK PAIN: 1
PALPITATIONS: 0
WEAKNESS: 0
FEVER: 0

## 2024-08-30 ASSESSMENT — FIBROSIS 4 INDEX: FIB4 SCORE: 2.294157338705617659

## 2024-08-30 NOTE — PROGRESS NOTES
"Verbal consent was acquired by the patient to use [employer] ambient listening note generation during this visit     CC:  Chief Complaint   Patient presents with    Back Pain     Back and neck pain X 2-3 days        Aye Marroquin 58 y.o. female  patient presenting for     History of Present Illness  The patient presents for evaluation of multiple medical concerns.    She is generally healthy and maintains an active lifestyle, including regular workouts. However, a few days ago, she experienced an unusual sensation in her hips and lower back. This morning, she began to feel pain at the base of her neck, which has since radiated down her spine. She describes the pain as sharp and stabbing, originating from the top of her spine and extending downwards. She also reports a throbbing, pinprick-like sensation in her lower back. There is no numbness, tingling, or weakness in her legs, hands, arms, or shoulders. She does not suffer from headaches or dizziness. She has been taking vitamin D and calcium supplements but ran out a few days ago. She was previously diagnosed with osteopenia 20 years ago. She believes that her current symptoms may be stress-related, as she has been under significant stress recently. Exercise alleviates her symptoms, but she has not tried applying heat to the affected areas.        Review of Systems   Constitutional:  Negative for chills, fever, malaise/fatigue and weight loss.   Respiratory:  Negative for shortness of breath.    Cardiovascular:  Negative for chest pain and palpitations.   Genitourinary: Negative.    Musculoskeletal:  Positive for back pain, joint pain and neck pain. Negative for falls and myalgias.   Neurological:  Negative for dizziness, tingling, weakness and headaches.       /88   Pulse 85   Temp 36.5 °C (97.7 °F) (Temporal)   Resp 18   Ht 1.651 m (5' 5\")   Wt 45.8 kg (101 lb)   SpO2 99% , Body mass index is 16.81 kg/m².    Physical Exam  Constitutional:       " Appearance: Normal appearance. She is cachectic.   HENT:      Head: Normocephalic and atraumatic.   Pulmonary:      Effort: Pulmonary effort is normal.   Abdominal:      General: Abdomen is flat. There is no distension.      Palpations: Abdomen is soft. There is no mass.      Tenderness: There is no abdominal tenderness. There is no guarding or rebound.      Hernia: No hernia is present.   Musculoskeletal:         General: Normal range of motion.      Cervical back: Normal. No swelling, edema, signs of trauma, tenderness, bony tenderness or crepitus. No pain with movement. Normal range of motion.      Thoracic back: No swelling, edema, spasms or tenderness. Normal range of motion.      Lumbar back: No swelling, edema, tenderness or bony tenderness. Normal range of motion.   Skin:     General: Skin is warm and dry.      Capillary Refill: Capillary refill takes less than 2 seconds.   Neurological:      Mental Status: She is alert and oriented to person, place, and time.   Psychiatric:         Mood and Affect: Mood normal.         Behavior: Behavior normal.         Thought Content: Thought content normal.         Judgment: Judgment normal.           Results      Assessment and Plan    Assessment & Plan  1. Back pain.   It was also noted that anxiety can manifest as physical symptoms, such as muscle tightness. A DEXA scan will be ordered to assess her bone density. She was advised to consider massage therapy for potential relief from muscle tightness. The use of heat application was also suggested. Physical therapy was discussed but not pursued at this time.         1. Screening for osteoporosis  - DS-BONE DENSITY STUDY (DEXA); Future    2. Vitamin D deficiency  Chronic and stable condition   - DS-BONE DENSITY STUDY (DEXA); Future    3. Anorexia nervosa, binge-eating purging type  Chronic and stable condition   - DS-BONE DENSITY STUDY (DEXA); Future       Discussed with patient possible alternative diagnoses, patient is  to take all medications as prescribed.     If symptoms persist FU w/PCP, if symptoms worsen go to emergency room.     If experiencing any side effects from prescribed medications reports to the office immediately or go to emergency room.    Reviewed indication, dosage, usage and potential adverse effects of prescribed medications.     Reviewed risks and benefits of treatment plan. Patient verbalizes understanding of all instruction and verbally agrees to plan.    Return for as needed.    This note was created using voice recognition software ([email or web address]). The accuracy of the dictation is limited by the abilities of the software. I have reviewed the note prior to signing, however some errors in grammar and context are still possible. If you have any questions related to this note please do not hesitate to contact our office.

## 2024-09-18 ENCOUNTER — HOSPITAL ENCOUNTER (OUTPATIENT)
Dept: RADIOLOGY | Facility: MEDICAL CENTER | Age: 59
End: 2024-09-18
Attending: NURSE PRACTITIONER
Payer: COMMERCIAL

## 2024-09-18 DIAGNOSIS — Z13.820 SCREENING FOR OSTEOPOROSIS: ICD-10-CM

## 2024-09-18 DIAGNOSIS — E55.9 VITAMIN D DEFICIENCY: ICD-10-CM

## 2024-09-18 DIAGNOSIS — F50.02 ANOREXIA NERVOSA, BINGE-EATING PURGING TYPE: ICD-10-CM

## 2024-09-18 PROCEDURE — 77067 SCR MAMMO BI INCL CAD: CPT

## 2024-09-18 PROCEDURE — 77080 DXA BONE DENSITY AXIAL: CPT

## 2024-10-04 ENCOUNTER — HOSPITAL ENCOUNTER (OUTPATIENT)
Dept: RADIOLOGY | Facility: MEDICAL CENTER | Age: 59
End: 2024-10-04
Payer: COMMERCIAL

## 2024-10-23 ENCOUNTER — APPOINTMENT (OUTPATIENT)
Dept: BEHAVIORAL HEALTH | Facility: PSYCHIATRIC FACILITY | Age: 59
End: 2024-10-23
Payer: COMMERCIAL

## 2025-01-09 ENCOUNTER — OFFICE VISIT (OUTPATIENT)
Dept: MEDICAL GROUP | Facility: PHYSICIAN GROUP | Age: 60
End: 2025-01-09
Payer: COMMERCIAL

## 2025-01-09 ENCOUNTER — HOSPITAL ENCOUNTER (OUTPATIENT)
Facility: MEDICAL CENTER | Age: 60
End: 2025-01-09
Attending: NURSE PRACTITIONER
Payer: COMMERCIAL

## 2025-01-09 VITALS
TEMPERATURE: 97.6 F | BODY MASS INDEX: 16.33 KG/M2 | SYSTOLIC BLOOD PRESSURE: 102 MMHG | DIASTOLIC BLOOD PRESSURE: 60 MMHG | OXYGEN SATURATION: 98 % | WEIGHT: 98 LBS | HEART RATE: 98 BPM | HEIGHT: 65 IN | RESPIRATION RATE: 16 BRPM

## 2025-01-09 DIAGNOSIS — B00.9 HERPES SIMPLEX: ICD-10-CM

## 2025-01-09 DIAGNOSIS — R39.9 UTI SYMPTOMS: ICD-10-CM

## 2025-01-09 DIAGNOSIS — Z12.12 SCREENING FOR COLORECTAL CANCER: ICD-10-CM

## 2025-01-09 DIAGNOSIS — N30.00 ACUTE CYSTITIS WITHOUT HEMATURIA: ICD-10-CM

## 2025-01-09 DIAGNOSIS — Z12.11 SCREENING FOR COLORECTAL CANCER: ICD-10-CM

## 2025-01-09 LAB
APPEARANCE UR: CLEAR
BILIRUB UR STRIP-MCNC: NEGATIVE MG/DL
COLOR UR AUTO: YELLOW
GLUCOSE UR STRIP.AUTO-MCNC: NEGATIVE MG/DL
KETONES UR STRIP.AUTO-MCNC: NEGATIVE MG/DL
LEUKOCYTE ESTERASE UR QL STRIP.AUTO: NORMAL
NITRITE UR QL STRIP.AUTO: NEGATIVE
PH UR STRIP.AUTO: 7 [PH] (ref 5–8)
PROT UR QL STRIP: 30 MG/DL
RBC UR QL AUTO: NEGATIVE
SP GR UR STRIP.AUTO: 1.02
UROBILINOGEN UR STRIP-MCNC: 0.2 MG/DL

## 2025-01-09 PROCEDURE — 99214 OFFICE O/P EST MOD 30 MIN: CPT | Performed by: NURSE PRACTITIONER

## 2025-01-09 PROCEDURE — 87086 URINE CULTURE/COLONY COUNT: CPT

## 2025-01-09 PROCEDURE — 81002 URINALYSIS NONAUTO W/O SCOPE: CPT | Performed by: NURSE PRACTITIONER

## 2025-01-09 PROCEDURE — 3078F DIAST BP <80 MM HG: CPT | Performed by: NURSE PRACTITIONER

## 2025-01-09 PROCEDURE — 3074F SYST BP LT 130 MM HG: CPT | Performed by: NURSE PRACTITIONER

## 2025-01-09 RX ORDER — SULFAMETHOXAZOLE AND TRIMETHOPRIM 800; 160 MG/1; MG/1
1 TABLET ORAL 2 TIMES DAILY
Qty: 6 TABLET | Refills: 0 | Status: SHIPPED | OUTPATIENT
Start: 2025-01-09

## 2025-01-09 RX ORDER — ACYCLOVIR 400 MG/1
400 TABLET ORAL 2 TIMES DAILY
Qty: 180 TABLET | Refills: 0 | Status: SHIPPED | OUTPATIENT
Start: 2025-01-09

## 2025-01-09 ASSESSMENT — ENCOUNTER SYMPTOMS
WEIGHT LOSS: 0
CONSTIPATION: 0
FLANK PAIN: 0
NAUSEA: 0
FEVER: 0
PALPITATIONS: 0
DIARRHEA: 0
CHILLS: 0
HEADACHES: 0
ABDOMINAL PAIN: 0
DIZZINESS: 0
VOMITING: 0

## 2025-01-09 ASSESSMENT — PATIENT HEALTH QUESTIONNAIRE - PHQ9: CLINICAL INTERPRETATION OF PHQ2 SCORE: 0

## 2025-01-09 ASSESSMENT — FIBROSIS 4 INDEX: FIB4 SCORE: 2.33

## 2025-01-09 NOTE — PROGRESS NOTES
Verbal consent was acquired by the patient to use [employer] ambient listening note generation during this visit     CC:  Chief Complaint   Patient presents with    UTI     X 3 days ago, burning, frequency     Follow-Up     acyclovir       Aye Marroquin 59 y.o. female  patient presenting for     History of Present Illness  The patient is a 59-year-old female who presents today to follow up on some discomfort with urination for the last 3 days and also to discuss about a FIT kit and acyclovir.    Dysuria  She has been experiencing dysuria for the past 3 days, which she describes as severe pain, particularly towards the end of the day. The pain typically persists for approximately 24 hours before subsiding and then recurring. She reports no urinary urgency, flank or back pain, hematuria, nausea, vomiting, lower pelvic or abdominal pain. Her symptoms are consistent with a urinary tract infection (UTI), characterized by a burning sensation during urination. She initiated self-treatment with Macrobid, an antibiotic from a previous prescription, on Monday night, taking one tablet nightly for the past three nights. She has not sought any online prescriptions due to her busy work schedule. She has 3 tablets left and is unsure what to do after that.    Herpes simplex  She is seeking a refill of her acyclovir prescription, despite not having any current outbreaks. She recently entered into a new relationship and wishes to take preventative measures. Her previous dosage was 400 mg twice daily, which she found effective as she typically experiences only two outbreaks per year.    Health maintenance   She is interested in doing colorectal cancer screening.          Review of Systems   Constitutional:  Negative for chills, fever, malaise/fatigue and weight loss.   Cardiovascular:  Negative for chest pain and palpitations.   Gastrointestinal:  Negative for abdominal pain, constipation, diarrhea, nausea and vomiting.  "  Genitourinary:  Positive for dysuria and frequency. Negative for flank pain, hematuria and urgency.   Neurological:  Negative for dizziness and headaches.       /60   Pulse 98   Temp 36.4 °C (97.6 °F) (Temporal)   Resp 16   Ht 1.651 m (5' 5\")   Wt 44.5 kg (98 lb)   SpO2 98% , Body mass index is 16.31 kg/m².    Physical Exam  Constitutional:       General: She is not in acute distress.     Appearance: Normal appearance. She is not ill-appearing.   HENT:      Head: Normocephalic and atraumatic.   Pulmonary:      Effort: Pulmonary effort is normal.   Abdominal:      Tenderness: There is no abdominal tenderness. There is no right CVA tenderness or left CVA tenderness.   Neurological:      Mental Status: She is alert and oriented to person, place, and time.   Psychiatric:         Mood and Affect: Mood normal.         Behavior: Behavior normal.         Thought Content: Thought content normal.         Judgment: Judgment normal.           Results  Lab Results   Component Value Date/Time    POCCOLOR yellow 01/09/2025 01:40 PM    POCAPPEAR clear 01/09/2025 01:40 PM    POCLEUKEST small 01/09/2025 01:40 PM    POCNITRITE negative 01/09/2025 01:40 PM    POCUROBILIGE 0.2 01/09/2025 01:40 PM    POCPROTEIN 30 01/09/2025 01:40 PM    POCURPH 7.0 01/09/2025 01:40 PM    POCBLOOD negative 01/09/2025 01:40 PM    POCSPGRV 1.020 01/09/2025 01:40 PM    POCKETONES negative 01/09/2025 01:40 PM    POCBILIRUBIN negative 01/09/2025 01:40 PM    POCGLUCUA negative 01/09/2025 01:40 PM          Assessment and Plan    Assessment & Plan          1. UTI symptoms  Acute and uncomplicated condition   - POCT Urinalysis  - URINE CULTURE(NEW); Future    2. Herpes simplex  Chronic and stable condition  - acyclovir (ZOVIRAX) 400 MG tablet; Take 1 Tablet by mouth 2 times a day.  Dispense: 180 Tablet; Refill: 0    3. Screening for colorectal cancer  - Occult Blood Feces Immunoassay (FIT); Future    4. Acute cystitis without hematuria  Acute and " uncomplicated condition   - sulfamethoxazole-trimethoprim (BACTRIM DS) 800-160 MG tablet; Take 1 Tablet by mouth 2 times a day.  Dispense: 6 Tablet; Refill: 0           Discussed with patient possible alternative diagnoses, patient is to take all medications as prescribed.     If symptoms persist FU w/PCP, if symptoms worsen go to emergency room.     If experiencing any side effects from prescribed medications reports to the office immediately or go to emergency room.    Reviewed indication, dosage, usage and potential adverse effects of prescribed medications.     Reviewed risks and benefits of treatment plan. Patient verbalizes understanding of all instruction and verbally agrees to plan.    Return for pending culture.    This note was created using voice recognition software ([employer]). The accuracy of the dictation is limited by the abilities of the software. I have reviewed the note prior to signing, however some errors in grammar and context are still possible. If you have any questions related to this note please do not hesitate to contact our office.

## 2025-01-12 LAB
BACTERIA UR CULT: NORMAL
SIGNIFICANT IND 70042: NORMAL
SITE SITE: NORMAL
SOURCE SOURCE: NORMAL

## 2025-01-20 ENCOUNTER — HOSPITAL ENCOUNTER (OUTPATIENT)
Facility: MEDICAL CENTER | Age: 60
End: 2025-01-20
Attending: PHYSICIAN ASSISTANT
Payer: COMMERCIAL

## 2025-01-20 ENCOUNTER — OFFICE VISIT (OUTPATIENT)
Dept: MEDICAL GROUP | Facility: PHYSICIAN GROUP | Age: 60
End: 2025-01-20
Payer: COMMERCIAL

## 2025-01-20 VITALS
RESPIRATION RATE: 12 BRPM | TEMPERATURE: 97.7 F | BODY MASS INDEX: 16.16 KG/M2 | SYSTOLIC BLOOD PRESSURE: 120 MMHG | WEIGHT: 97 LBS | OXYGEN SATURATION: 99 % | HEART RATE: 96 BPM | DIASTOLIC BLOOD PRESSURE: 78 MMHG | HEIGHT: 65 IN

## 2025-01-20 DIAGNOSIS — N30.00 ACUTE CYSTITIS WITHOUT HEMATURIA: ICD-10-CM

## 2025-01-20 DIAGNOSIS — R39.9 UTI SYMPTOMS: ICD-10-CM

## 2025-01-20 LAB
APPEARANCE UR: CLEAR
BILIRUB UR STRIP-MCNC: NEGATIVE MG/DL
COLOR UR AUTO: YELLOW
GLUCOSE UR STRIP.AUTO-MCNC: NEGATIVE MG/DL
KETONES UR STRIP.AUTO-MCNC: NEGATIVE MG/DL
LEUKOCYTE ESTERASE UR QL STRIP.AUTO: NORMAL
NITRITE UR QL STRIP.AUTO: NEGATIVE
PH UR STRIP.AUTO: 7 [PH] (ref 5–8)
PROT UR QL STRIP: NEGATIVE MG/DL
RBC UR QL AUTO: NORMAL
SP GR UR STRIP.AUTO: 1.02
UROBILINOGEN UR STRIP-MCNC: NORMAL MG/DL

## 2025-01-20 PROCEDURE — 99213 OFFICE O/P EST LOW 20 MIN: CPT | Performed by: PHYSICIAN ASSISTANT

## 2025-01-20 PROCEDURE — 3074F SYST BP LT 130 MM HG: CPT | Performed by: PHYSICIAN ASSISTANT

## 2025-01-20 PROCEDURE — 3078F DIAST BP <80 MM HG: CPT | Performed by: PHYSICIAN ASSISTANT

## 2025-01-20 PROCEDURE — 87591 N.GONORRHOEAE DNA AMP PROB: CPT

## 2025-01-20 PROCEDURE — 87086 URINE CULTURE/COLONY COUNT: CPT

## 2025-01-20 PROCEDURE — 87491 CHLMYD TRACH DNA AMP PROBE: CPT

## 2025-01-20 PROCEDURE — 81002 URINALYSIS NONAUTO W/O SCOPE: CPT | Performed by: PHYSICIAN ASSISTANT

## 2025-01-20 RX ORDER — NITROFURANTOIN 25; 75 MG/1; MG/1
100 CAPSULE ORAL 2 TIMES DAILY
Qty: 14 CAPSULE | Refills: 0 | Status: SHIPPED | OUTPATIENT
Start: 2025-01-20 | End: 2025-01-27

## 2025-01-20 ASSESSMENT — FIBROSIS 4 INDEX: FIB4 SCORE: 2.33

## 2025-01-21 ENCOUNTER — OFFICE VISIT (OUTPATIENT)
Dept: BEHAVIORAL HEALTH | Facility: PSYCHIATRIC FACILITY | Age: 60
End: 2025-01-21
Payer: COMMERCIAL

## 2025-01-21 VITALS
HEART RATE: 72 BPM | SYSTOLIC BLOOD PRESSURE: 108 MMHG | OXYGEN SATURATION: 93 % | WEIGHT: 98.6 LBS | DIASTOLIC BLOOD PRESSURE: 70 MMHG | HEIGHT: 65 IN | BODY MASS INDEX: 16.43 KG/M2

## 2025-01-21 DIAGNOSIS — F41.9 ANXIETY AND DEPRESSION: ICD-10-CM

## 2025-01-21 DIAGNOSIS — F50.029 ANOREXIA NERVOSA, BINGE-EATING PURGING TYPE: ICD-10-CM

## 2025-01-21 DIAGNOSIS — F50.029 ANOREXIA NERVOSA, BINGE-EATING PURGING TYPE, UNSPECIFIED SEVERITY: ICD-10-CM

## 2025-01-21 DIAGNOSIS — F32.A ANXIETY AND DEPRESSION: ICD-10-CM

## 2025-01-21 DIAGNOSIS — F60.3 BORDERLINE PERSONALITY DISORDER (HCC): ICD-10-CM

## 2025-01-21 LAB
C TRACH DNA SPEC QL NAA+PROBE: NEGATIVE
N GONORRHOEA DNA SPEC QL NAA+PROBE: NEGATIVE
SPECIMEN SOURCE: NORMAL

## 2025-01-21 PROCEDURE — 3074F SYST BP LT 130 MM HG: CPT

## 2025-01-21 PROCEDURE — 3078F DIAST BP <80 MM HG: CPT

## 2025-01-21 PROCEDURE — 99214 OFFICE O/P EST MOD 30 MIN: CPT

## 2025-01-21 RX ORDER — VENLAFAXINE HYDROCHLORIDE 150 MG/1
300 CAPSULE, EXTENDED RELEASE ORAL DAILY
Qty: 60 CAPSULE | Refills: 2 | Status: SHIPPED | OUTPATIENT
Start: 2025-01-21 | End: 2025-04-21

## 2025-01-21 RX ORDER — CLONAZEPAM 1 MG/1
1 TABLET ORAL
Qty: 30 TABLET | Refills: 0 | Status: SHIPPED | OUTPATIENT
Start: 2025-01-21 | End: 2025-02-20

## 2025-01-21 RX ORDER — GABAPENTIN 600 MG/1
600 TABLET ORAL
Qty: 30 TABLET | Refills: 2 | Status: SHIPPED | OUTPATIENT
Start: 2025-01-21 | End: 2025-04-21

## 2025-01-21 RX ORDER — MIRTAZAPINE 45 MG/1
45 TABLET, FILM COATED ORAL
Qty: 30 TABLET | Refills: 3 | Status: SHIPPED | OUTPATIENT
Start: 2025-01-21

## 2025-01-21 ASSESSMENT — ENCOUNTER SYMPTOMS
FALLS: 0
TINGLING: 0
PALPITATIONS: 0
HALLUCINATIONS: 0
FEVER: 0
CONSTIPATION: 0
DIZZINESS: 0
BLURRED VISION: 0
COUGH: 0
TREMORS: 0
SEIZURES: 0
HEADACHES: 0
POLYDIPSIA: 0
WHEEZING: 0
DIARRHEA: 0
CHILLS: 0
DOUBLE VISION: 0

## 2025-01-21 ASSESSMENT — FIBROSIS 4 INDEX: FIB4 SCORE: 2.33

## 2025-01-21 ASSESSMENT — LIFESTYLE VARIABLES: SUBSTANCE_ABUSE: 0
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
## 2025-01-21 NOTE — PROGRESS NOTES
"CC:  Chief Complaint   Patient presents with    Follow-Up       HISTORY OF PRESENT ILLNESS: Patient is a 59 y.o. female established patient presenting with issues below  Patient recently finished course of Bactrim for a UTI.  Her urine culture returned contaminated.  She is still having symptoms, notably urinary frequency and dysuria.  States that the area around her urethra is irritated and inflamed.    Current Outpatient Medications   Medication Sig Dispense Refill    acyclovir (ZOVIRAX) 400 MG tablet Take 1 Tablet by mouth 2 times a day. 180 Tablet 0    clonazePAM (KLONOPIN) 1 MG Tab       lamoTRIgine (LAMICTAL) 100 MG Tab       gabapentin (NEURONTIN) 300 MG Cap TAKE 1 CAPSULE BY MOUTH ONCE DAILY AS NEEDED FOR ANXIETY FOR  UP  TO  30  DAYS 30 Capsule 0    mirtazapine (REMERON) 45 MG tablet Take 1 Tablet by mouth at bedtime. 30 Tablet 3    venlafaxine (EFFEXOR-XR) 150 MG extended-release capsule Take 2 Capsules by mouth every day. 90 Capsule 0    sulfamethoxazole-trimethoprim (BACTRIM DS) 800-160 MG tablet Take 1 Tablet by mouth 2 times a day. (Patient not taking: Reported on 1/20/2025) 6 Tablet 0    lamoTRIgine (LAMICTAL) 100 MG Tab  (Patient not taking: Reported on 1/9/2025)       No current facility-administered medications for this visit.        ROS:     ROS    Exam:    /78   Pulse 96   Temp 36.5 °C (97.7 °F) (Temporal)   Resp 12   Ht 1.651 m (5' 5\")   Wt 44 kg (97 lb)   SpO2 99%  Body mass index is 16.14 kg/m².    General:  Well nourished, well developed female in NAD  Head is grossly normal.  Neck: Supple.   Skin: Warm and dry. No obvious lesions  Neuro: Normal muscle tone. Gait normal. Alert and oriented.  Psych: Normal mood and affect      Please note that this dictation was created using voice recognition software. I have made every reasonable attempt to correct obvious errors, but I expect that there are errors of grammar and possibly content that I did not discover before finalizing the " note.        Assessment/Plan:    1. UTI symptoms  - Chlamydia/GC, PCR (Urine); Future  - POCT Urinalysis    2. Acute cystitis without hematuria  Possibly resistant to Bactrim.  Will check new urine culture and treat with Macrobid.  - nitrofurantoin (MACROBID) 100 MG Cap; Take 1 Capsule by mouth 2 times a day for 7 days.  Dispense: 14 Capsule; Refill: 0  - URINE CULTURE(NEW); Future

## 2025-01-22 NOTE — PSYCHOTHERAPY
She reported the stalker is someone named Felipe Muller    She reports she has been in a romantic relationship for the past two months, but that it's difficult because he's  and their time together is limited, she thinks that his wife suspects but doesn't know about this and that he does not have an open marriage. She reports she walks her dogs in the park with him every night, and on the weekends sees him twice a day and she is sexually involved with him. She reports her friendships are very tenuous, that her best friend for about 20 years had a phone conversation with her where she expressed concern for Mrs. Marroquin's well-being and described the perception that the stalker Mrs. Marroquin described is impossible.

## 2025-01-23 LAB
BACTERIA UR CULT: NORMAL
SIGNIFICANT IND 70042: NORMAL
SITE SITE: NORMAL
SOURCE SOURCE: NORMAL

## 2025-01-24 ENCOUNTER — APPOINTMENT (OUTPATIENT)
Dept: MEDICAL GROUP | Facility: PHYSICIAN GROUP | Age: 60
End: 2025-01-24
Payer: COMMERCIAL

## 2025-01-24 PROBLEM — F32.A DEPRESSIVE DISORDER: Status: ACTIVE | Noted: 2025-01-24

## 2025-01-24 PROBLEM — F41.9 ANXIETY AND DEPRESSION: Status: ACTIVE | Noted: 2025-01-24

## 2025-01-24 NOTE — ASSESSMENT & PLAN NOTE
Problem type: Chronic Illness, Stable     Assessment: See under borderline personality disorder for integrated and synthesized assessment     Plan  Medication:   - Continue gabapentin 600mg per evening for depression and prevention of anxiety  - Continue mirtazapine 45mg per evening for depression  - Continue Effexor XR 300mg per day for depression  - After screening labs are returned, consider starting olanzapine 2.5mg per evening or depression     Therapy: Mrs. Marroquin reports she is engaged in therapy     Other Orders: Labs as seen below

## 2025-01-24 NOTE — ASSESSMENT & PLAN NOTE
Problem type: Chronic Illness, Stable    Assessment: See under borderline personality disorder for integrated and synthesized assessmenet    Plan  Medication:   - Continue gabapentin 600mg per evening for depression and prevention of anxiety  - Continue mirtazapine 45mg per evening for depression  - Continue Effexor XR 300mg per day for depression  - Continue clonazepam up to 1mg per day as needed for anxiety  - After screening labs are returned, consider starting olanzapine 2.5mg per evening or depression    Therapy: Mrs. Marroquin reports she is engaged in therapy    Other Orders: Labs described above under anorexia nervosa

## 2025-02-04 ENCOUNTER — OFFICE VISIT (OUTPATIENT)
Dept: BEHAVIORAL HEALTH | Facility: PSYCHIATRIC FACILITY | Age: 60
End: 2025-02-04
Payer: COMMERCIAL

## 2025-02-04 VITALS
WEIGHT: 103 LBS | HEIGHT: 65 IN | HEART RATE: 75 BPM | OXYGEN SATURATION: 93 % | DIASTOLIC BLOOD PRESSURE: 62 MMHG | SYSTOLIC BLOOD PRESSURE: 104 MMHG | BODY MASS INDEX: 17.16 KG/M2

## 2025-02-04 DIAGNOSIS — F50.029 ANOREXIA NERVOSA, BINGE-EATING PURGING TYPE, UNSPECIFIED SEVERITY: ICD-10-CM

## 2025-02-04 DIAGNOSIS — F60.3 BORDERLINE PERSONALITY DISORDER (HCC): ICD-10-CM

## 2025-02-04 DIAGNOSIS — F33.2 SEVERE EPISODE OF RECURRENT MAJOR DEPRESSIVE DISORDER, WITHOUT PSYCHOTIC FEATURES (HCC): ICD-10-CM

## 2025-02-04 DIAGNOSIS — F41.9 ANXIETY AND DEPRESSION: ICD-10-CM

## 2025-02-04 DIAGNOSIS — F32.A ANXIETY AND DEPRESSION: ICD-10-CM

## 2025-02-04 DIAGNOSIS — F50.029 ANOREXIA NERVOSA, BINGE-EATING PURGING TYPE: ICD-10-CM

## 2025-02-04 RX ORDER — OLANZAPINE 2.5 MG/1
2.5 TABLET, FILM COATED ORAL NIGHTLY
Qty: 30 TABLET | Refills: 2 | Status: SHIPPED | OUTPATIENT
Start: 2025-02-04 | End: 2025-05-05

## 2025-02-04 ASSESSMENT — ENCOUNTER SYMPTOMS
WHEEZING: 0
BLURRED VISION: 0
FEVER: 0
BLOOD IN STOOL: 0
FALLS: 0
SEIZURES: 0

## 2025-02-04 ASSESSMENT — FIBROSIS 4 INDEX: FIB4 SCORE: 1.38
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
## 2025-02-10 RX ORDER — VENLAFAXINE HYDROCHLORIDE 150 MG/1
300 CAPSULE, EXTENDED RELEASE ORAL DAILY
Qty: 60 CAPSULE | Refills: 2 | Status: SHIPPED | OUTPATIENT
Start: 2025-02-10 | End: 2025-05-11

## 2025-02-10 RX ORDER — MIRTAZAPINE 45 MG/1
45 TABLET, FILM COATED ORAL
Qty: 30 TABLET | Refills: 3 | Status: SHIPPED | OUTPATIENT
Start: 2025-02-10

## 2025-02-10 RX ORDER — CLONAZEPAM 1 MG/1
1 TABLET ORAL
Qty: 30 TABLET | Refills: 0 | Status: SHIPPED | OUTPATIENT
Start: 2025-02-21 | End: 2025-03-23

## 2025-02-10 RX ORDER — GABAPENTIN 600 MG/1
600 TABLET ORAL
Qty: 30 TABLET | Refills: 2 | Status: SHIPPED | OUTPATIENT
Start: 2025-02-10 | End: 2025-05-11

## 2025-02-10 NOTE — ASSESSMENT & PLAN NOTE
Problem type: Chronic Illness, Stable     Assessment: See under borderline personality disorder for integrated and synthesized assessment     Plan  Medication:   - Continue gabapentin 600mg per evening for depression and prevention of anxiety  - Continue mirtazapine 45mg per evening for depression  - Continue Effexor XR 300mg per day for depression  - Start olanzapine 2.5mg per evening or depression     Therapy: Mrs. Marroquin reports she is engaged in therapy     Other Orders: reviewed labs, elevated cholesterol and triglycerides noted, ASCVD calculator is low risk 10 year risk, statin not indicated at this time
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
## 2025-02-10 NOTE — ASSESSMENT & PLAN NOTE
- Continue home Klonopin, Remeron, gabapentin, and venlafaxine.   - Start olanzapine 2.5mg per evening or depression

## 2025-02-10 NOTE — ASSESSMENT & PLAN NOTE
Problem type: Chronic Illness, Stable    Assessment: See under borderline personality disorder for integrated and synthesized assessmenet    Plan  Medication:   - Continue gabapentin 600mg per evening for depression and prevention of anxiety  - Continue mirtazapine 45mg per evening for depression  - Continue Effexor XR 300mg per day for depression  - Continue clonazepam up to 1mg per day as needed for anxiety  - Start olanzapine 2.5mg per evening or depression    Therapy: Mrs. Marroquin reports she is engaged in therapy    Other Orders: Labs described above under anorexia nervosa

## 2025-02-11 ENCOUNTER — TELEPHONE (OUTPATIENT)
Dept: BEHAVIORAL HEALTH | Facility: PSYCHIATRIC FACILITY | Age: 60
End: 2025-02-11
Payer: COMMERCIAL

## 2025-02-11 NOTE — TELEPHONE ENCOUNTER
Attempted to followup on patient call from 2/7/25. Left a VM asking for a call back. Also sent patient a message on [employer]

## 2025-02-20 ENCOUNTER — HOSPITAL ENCOUNTER (OUTPATIENT)
Facility: MEDICAL CENTER | Age: 60
End: 2025-02-20
Attending: NURSE PRACTITIONER
Payer: COMMERCIAL

## 2025-02-20 ENCOUNTER — OFFICE VISIT (OUTPATIENT)
Dept: MEDICAL GROUP | Facility: PHYSICIAN GROUP | Age: 60
End: 2025-02-20
Payer: COMMERCIAL

## 2025-02-20 VITALS
SYSTOLIC BLOOD PRESSURE: 122 MMHG | BODY MASS INDEX: 17.11 KG/M2 | OXYGEN SATURATION: 93 % | DIASTOLIC BLOOD PRESSURE: 60 MMHG | RESPIRATION RATE: 16 BRPM | WEIGHT: 100.2 LBS | TEMPERATURE: 98.2 F | HEIGHT: 64 IN | HEART RATE: 56 BPM

## 2025-02-20 DIAGNOSIS — Z12.4 SCREENING FOR CERVICAL CANCER: ICD-10-CM

## 2025-02-20 DIAGNOSIS — Z01.419 ENCOUNTER FOR GYNECOLOGICAL EXAMINATION: ICD-10-CM

## 2025-02-20 DIAGNOSIS — R10.2 VAGINAL PAIN: ICD-10-CM

## 2025-02-20 DIAGNOSIS — Z11.51 SCREENING FOR HPV (HUMAN PAPILLOMAVIRUS): ICD-10-CM

## 2025-02-20 DIAGNOSIS — N89.8 VAGINAL DISCHARGE: ICD-10-CM

## 2025-02-20 PROCEDURE — 99214 OFFICE O/P EST MOD 30 MIN: CPT | Performed by: NURSE PRACTITIONER

## 2025-02-20 PROCEDURE — 87510 GARDNER VAG DNA DIR PROBE: CPT

## 2025-02-20 PROCEDURE — 87480 CANDIDA DNA DIR PROBE: CPT

## 2025-02-20 PROCEDURE — 99000 SPECIMEN HANDLING OFFICE-LAB: CPT | Performed by: NURSE PRACTITIONER

## 2025-02-20 PROCEDURE — 88142 CYTOPATH C/V THIN LAYER: CPT

## 2025-02-20 PROCEDURE — 87624 HPV HI-RISK TYP POOLED RSLT: CPT

## 2025-02-20 PROCEDURE — 3074F SYST BP LT 130 MM HG: CPT | Performed by: NURSE PRACTITIONER

## 2025-02-20 PROCEDURE — 3078F DIAST BP <80 MM HG: CPT | Performed by: NURSE PRACTITIONER

## 2025-02-20 PROCEDURE — 87660 TRICHOMONAS VAGIN DIR PROBE: CPT

## 2025-02-20 ASSESSMENT — ENCOUNTER SYMPTOMS
FEVER: 0
NAUSEA: 0
VOMITING: 0
CHILLS: 0
WEIGHT LOSS: 0
ABDOMINAL PAIN: 0
FLANK PAIN: 0

## 2025-02-20 ASSESSMENT — FIBROSIS 4 INDEX: FIB4 SCORE: 1.38

## 2025-02-21 ENCOUNTER — RESULTS FOLLOW-UP (OUTPATIENT)
Dept: MEDICAL GROUP | Facility: PHYSICIAN GROUP | Age: 60
End: 2025-02-21

## 2025-02-21 LAB
CANDIDA DNA VAG QL PROBE+SIG AMP: NEGATIVE
G VAGINALIS DNA VAG QL PROBE+SIG AMP: NEGATIVE
T VAGINALIS DNA VAG QL PROBE+SIG AMP: NEGATIVE

## 2025-02-21 NOTE — PROGRESS NOTES
Verbal consent was acquired by the patient to use [employer] ambient listening note generation during this visit     CC:  Chief Complaint   Patient presents with    Other     Pelvic inflammation pt states she's been on 2 courses of antibiotics and nothing has helped       Aye Marroquin 59 y.o. female  patient presenting for     History of Present Illness  The patient is a 59-year-old female who presents today for potential pelvic inflammatory disease.    She reports persistent symptoms of vaginal pain despite undergoing two courses of treatment for UTI. She describes an unusual sensation of inflammation and sensitivity in her pelvic region, which she has not previously experienced. She does not report any systemic symptoms such as chills, fevers, or sweats. She also does not experience any gastrointestinal symptoms such as nausea, vomiting, or abdominal pain. She is sexually active and has tested negative for chlamydia. She does not experience any back pain, hematuria, or increased urinary frequency. However, she occasionally experiences urinary urgency and dysuria, although these symptoms have improved since her initial visit. She reports an unfamiliar vaginal discharge with a whitish-greenish hue. She does not experience any lower abdominal pain but reports vaginal canal pain, particularly during digital examination. She also reports dyspareunia but does not experience post-coital bleeding. These symptoms have been present for several months. Her last Pap smear was conducted over 3 years ago.      Review of Systems   Constitutional:  Negative for chills, fever, malaise/fatigue and weight loss.   Gastrointestinal:  Negative for abdominal pain, nausea and vomiting.   Genitourinary:  Positive for urgency. Negative for dysuria, flank pain, frequency and hematuria.       /60 (BP Location: Left arm, Patient Position: Sitting, BP Cuff Size: Adult)   Pulse (!) 56   Temp 36.8 °C (98.2 °F)   Resp 16   Ht  "1.626 m (5' 4\")   Wt 45.5 kg (100 lb 3.2 oz)   SpO2 93% , Body mass index is 17.2 kg/m².    Physical Exam  Constitutional:       General: She is not in acute distress.     Appearance: Normal appearance. She is not ill-appearing.   HENT:      Head: Normocephalic and atraumatic.   Pulmonary:      Effort: Pulmonary effort is normal.   Abdominal:      General: Abdomen is flat. Bowel sounds are normal. There is no distension.      Palpations: Abdomen is soft. There is no mass.      Tenderness: There is no abdominal tenderness. There is no right CVA tenderness, left CVA tenderness, guarding or rebound.      Hernia: No hernia is present.   Skin:     General: Skin is warm and dry.      Capillary Refill: Capillary refill takes less than 2 seconds.   Neurological:      Mental Status: She is alert and oriented to person, place, and time.   Psychiatric:         Mood and Affect: Mood normal.         Behavior: Behavior normal.         Thought Content: Thought content normal.         Judgment: Judgment normal.       External Genitalia: general appearance normal, hair distrubution, no lesions noted  Vulva: grossly unremarkable, no lesions or masses noted  Vagina: no abnormal discharge, normal color, minimal erythema to right side vaginal canal - slightly excoriated, normal rugae  Cervix: nonparous, nonfriable, no surface lesions identified, Pap was performed  Uterus: Normal shape, position and consistency   Bladder: empty  Bimanual exam: No uteromegaly, negative chandelier sign, adnexa freely movable and without enlargements bilaterally  Rectal: not performed  Results      Assessment and Plan    Assessment & Plan      1. Screening for cervical cancer  - Thinprep Pap with HPV; Future    2. Encounter for gynecological examination  - Thinprep Pap with HPV; Future    3. Screening for HPV (human papillomavirus)  - Thinprep Pap with HPV; Future    4. Vaginal pain  Chronic and stable condition   - VAGINAL PATHOGENS DNA PANEL; " Future    5. Vaginal discharge  Chronic and stable condition   - VAGINAL PATHOGENS DNA PANEL; Future     Discussed with patient possible alternative diagnoses, patient is to take all medications as prescribed.     If symptoms persist FU w/PCP, if symptoms worsen go to emergency room.     If experiencing any side effects from prescribed medications reports to the office immediately or go to emergency room.    Reviewed indication, dosage, usage and potential adverse effects of prescribed medications.     Reviewed risks and benefits of treatment plan. Patient verbalizes understanding of all instruction and verbally agrees to plan.    No follow-ups on file.    This note was created using voice recognition software ([employer]). The accuracy of the dictation is limited by the abilities of the software. I have reviewed the note prior to signing, however some errors in grammar and context are still possible. If you have any questions related to this note please do not hesitate to contact our office.

## 2025-02-26 LAB
HPV I/H RISK 1 DNA SPEC QL NAA+PROBE: NOT DETECTED
SPECIMEN SOURCE: NORMAL
THINPREP PAP, CYTOLOGY NL11781: NORMAL

## 2025-03-04 ENCOUNTER — OFFICE VISIT (OUTPATIENT)
Dept: BEHAVIORAL HEALTH | Facility: PSYCHIATRIC FACILITY | Age: 60
End: 2025-03-04
Payer: COMMERCIAL

## 2025-03-04 VITALS
HEART RATE: 86 BPM | OXYGEN SATURATION: 95 % | DIASTOLIC BLOOD PRESSURE: 68 MMHG | SYSTOLIC BLOOD PRESSURE: 110 MMHG | BODY MASS INDEX: 17.14 KG/M2 | HEIGHT: 64 IN | WEIGHT: 100.4 LBS

## 2025-03-04 DIAGNOSIS — F50.029 ANOREXIA NERVOSA, BINGE-EATING PURGING TYPE: ICD-10-CM

## 2025-03-04 DIAGNOSIS — F41.9 ANXIETY AND DEPRESSION: ICD-10-CM

## 2025-03-04 DIAGNOSIS — F60.3 BORDERLINE PERSONALITY DISORDER (HCC): ICD-10-CM

## 2025-03-04 DIAGNOSIS — F32.A ANXIETY AND DEPRESSION: ICD-10-CM

## 2025-03-04 DIAGNOSIS — F50.029 ANOREXIA NERVOSA, BINGE-EATING PURGING TYPE, UNSPECIFIED SEVERITY: ICD-10-CM

## 2025-03-04 PROCEDURE — 99999 PR NO CHARGE: CPT

## 2025-03-04 RX ORDER — GABAPENTIN 600 MG/1
600 TABLET ORAL
Qty: 30 TABLET | Refills: 2 | Status: SHIPPED | OUTPATIENT
Start: 2025-03-04 | End: 2025-06-02

## 2025-03-04 RX ORDER — OLANZAPINE 2.5 MG/1
2.5 TABLET, FILM COATED ORAL NIGHTLY
Qty: 30 TABLET | Refills: 2 | Status: SHIPPED | OUTPATIENT
Start: 2025-03-04 | End: 2025-06-02

## 2025-03-04 RX ORDER — MIRTAZAPINE 45 MG/1
45 TABLET, FILM COATED ORAL
Qty: 30 TABLET | Refills: 3 | Status: SHIPPED | OUTPATIENT
Start: 2025-03-04

## 2025-03-04 RX ORDER — VENLAFAXINE HYDROCHLORIDE 150 MG/1
300 CAPSULE, EXTENDED RELEASE ORAL DAILY
Qty: 60 CAPSULE | Refills: 2 | Status: SHIPPED | OUTPATIENT
Start: 2025-03-04 | End: 2025-06-02

## 2025-03-04 RX ORDER — CLONAZEPAM 1 MG/1
1 TABLET ORAL
Qty: 30 TABLET | Refills: 0 | Status: SHIPPED | OUTPATIENT
Start: 2025-03-04 | End: 2025-04-03

## 2025-03-04 ASSESSMENT — FIBROSIS 4 INDEX: FIB4 SCORE: 1.38

## 2025-03-04 ASSESSMENT — ENCOUNTER SYMPTOMS
BLOOD IN STOOL: 0
WHEEZING: 0
FALLS: 0
HALLUCINATIONS: 0
FEVER: 0
BLURRED VISION: 0
PALPITATIONS: 0
SEIZURES: 0
SHORTNESS OF BREATH: 0
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
## 2025-03-04 NOTE — ASSESSMENT & PLAN NOTE
Problem type: Chronic Illness, Stable    Assessment: See under borderline personality disorder for integrated and synthesized assessmenet    Plan  Medication:   - Continue gabapentin 600mg per evening for depression and prevention of anxiety  - Continue mirtazapine 45mg per evening for depression  - Continue Effexor XR 300mg per day for depression  - Continue clonazepam up to 1mg per day as needed for anxiety  - Continueolanzapine 2.5mg per evening or depression    Therapy: Mrs. Marroquin reports she is engaged in therapy    Other Orders: Labs described above under anorexia nervosa

## 2025-03-04 NOTE — ASSESSMENT & PLAN NOTE
Problem type: Chronic Illness, Stable     Assessment: See under borderline personality disorder for integrated and synthesized assessment     Plan  Medication:   - Continue gabapentin 600mg per evening for depression and prevention of anxiety  - Continue mirtazapine 45mg per evening for depression  - Continue Effexor XR 300mg per day for depression  - Continue olanzapine 2.5mg per evening or depression     Therapy: Mrs. Marroquin reports she is engaged in therapy     Other Orders: reviewed labs, elevated cholesterol and triglycerides noted, ASCVD calculator is low risk 10 year risk, statin not indicated at this time

## 2025-03-05 ENCOUNTER — APPOINTMENT (OUTPATIENT)
Dept: MEDICAL GROUP | Facility: PHYSICIAN GROUP | Age: 60
End: 2025-03-05
Payer: COMMERCIAL

## 2025-04-15 ENCOUNTER — OFFICE VISIT (OUTPATIENT)
Dept: BEHAVIORAL HEALTH | Facility: PSYCHIATRIC FACILITY | Age: 60
End: 2025-04-15
Payer: COMMERCIAL

## 2025-04-15 VITALS
SYSTOLIC BLOOD PRESSURE: 124 MMHG | HEIGHT: 64 IN | BODY MASS INDEX: 17.23 KG/M2 | OXYGEN SATURATION: 96 % | HEART RATE: 77 BPM | DIASTOLIC BLOOD PRESSURE: 76 MMHG

## 2025-04-15 DIAGNOSIS — F50.029 ANOREXIA NERVOSA, BINGE-EATING PURGING TYPE: ICD-10-CM

## 2025-04-15 DIAGNOSIS — F41.9 ANXIETY DISORDER, UNSPECIFIED TYPE: ICD-10-CM

## 2025-04-15 DIAGNOSIS — F32.A ANXIETY AND DEPRESSION: ICD-10-CM

## 2025-04-15 DIAGNOSIS — F60.3 BORDERLINE PERSONALITY DISORDER (HCC): ICD-10-CM

## 2025-04-15 DIAGNOSIS — F41.9 ANXIETY AND DEPRESSION: ICD-10-CM

## 2025-04-15 PROCEDURE — 99999 PR NO CHARGE: CPT

## 2025-04-15 RX ORDER — MIRTAZAPINE 45 MG/1
45 TABLET, FILM COATED ORAL
Qty: 30 TABLET | Refills: 3 | Status: SHIPPED | OUTPATIENT
Start: 2025-04-15

## 2025-04-15 RX ORDER — GABAPENTIN 600 MG/1
600 TABLET ORAL
Qty: 30 TABLET | Refills: 2 | Status: SHIPPED | OUTPATIENT
Start: 2025-04-15 | End: 2025-07-14

## 2025-04-15 RX ORDER — VENLAFAXINE HYDROCHLORIDE 150 MG/1
300 CAPSULE, EXTENDED RELEASE ORAL DAILY
Qty: 60 CAPSULE | Refills: 2 | Status: SHIPPED | OUTPATIENT
Start: 2025-04-15 | End: 2025-07-14

## 2025-04-15 RX ORDER — OLANZAPINE 2.5 MG/1
2.5 TABLET, FILM COATED ORAL NIGHTLY
Qty: 30 TABLET | Refills: 2 | Status: SHIPPED | OUTPATIENT
Start: 2025-04-15 | End: 2025-07-14

## 2025-04-15 RX ORDER — CLONAZEPAM 0.5 MG/1
0.5 TABLET ORAL
Qty: 30 TABLET | Refills: 0 | Status: SHIPPED | OUTPATIENT
Start: 2025-04-15 | End: 2025-05-15

## 2025-04-15 ASSESSMENT — LIFESTYLE VARIABLES: SUBSTANCE_ABUSE: 0

## 2025-04-15 ASSESSMENT — ENCOUNTER SYMPTOMS
SEIZURES: 0
WHEEZING: 0
PALPITATIONS: 0
BLURRED VISION: 0
FEVER: 0
DIARRHEA: 0
CONSTIPATION: 0
ABDOMINAL PAIN: 0
FALLS: 0

## 2025-04-15 NOTE — ASSESSMENT & PLAN NOTE
Problem type: Chronic Illness, Stable    Assessment: See under borderline personality disorder for integrated and synthesized assessmenet    Plan  Medication:   - Continue gabapentin 600mg per evening for depression and prevention of anxiety  - Continue mirtazapine 45mg per evening for depression  - Continue Effexor XR 300mg per day for depression  - Change clonazepam to up to 0.5mg per day from up to 1mg per day as needed for anxiety  - Continue olanzapine 2.5mg per evening for depression    Therapy: Mrs. Marroquin reports she is engaged in therapy    Other Orders: Labs described above under anorexia nervosa
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
## 2025-05-13 ENCOUNTER — OFFICE VISIT (OUTPATIENT)
Dept: BEHAVIORAL HEALTH | Facility: PSYCHIATRIC FACILITY | Age: 60
End: 2025-05-13
Payer: COMMERCIAL

## 2025-05-13 VITALS
SYSTOLIC BLOOD PRESSURE: 124 MMHG | WEIGHT: 106.4 LBS | HEIGHT: 64 IN | DIASTOLIC BLOOD PRESSURE: 75 MMHG | BODY MASS INDEX: 18.16 KG/M2 | OXYGEN SATURATION: 98 % | HEART RATE: 74 BPM

## 2025-05-13 DIAGNOSIS — F32.A ANXIETY AND DEPRESSION: Primary | ICD-10-CM

## 2025-05-13 DIAGNOSIS — F60.3 BORDERLINE PERSONALITY DISORDER (HCC): ICD-10-CM

## 2025-05-13 DIAGNOSIS — F41.9 ANXIETY AND DEPRESSION: Primary | ICD-10-CM

## 2025-05-13 DIAGNOSIS — F41.9 ANXIETY DISORDER, UNSPECIFIED TYPE: ICD-10-CM

## 2025-05-13 DIAGNOSIS — F50.029 ANOREXIA NERVOSA, BINGE-EATING PURGING TYPE: ICD-10-CM

## 2025-05-13 PROCEDURE — 99214 OFFICE O/P EST MOD 30 MIN: CPT | Mod: GC

## 2025-05-13 PROCEDURE — 3074F SYST BP LT 130 MM HG: CPT

## 2025-05-13 PROCEDURE — 3078F DIAST BP <80 MM HG: CPT

## 2025-05-13 RX ORDER — CLONAZEPAM 0.5 MG/1
0.5 TABLET ORAL
Qty: 30 TABLET | Refills: 0 | Status: SHIPPED | OUTPATIENT
Start: 2025-05-15 | End: 2025-06-14

## 2025-05-13 RX ORDER — MIRTAZAPINE 45 MG/1
45 TABLET, FILM COATED ORAL
Qty: 30 TABLET | Refills: 3 | Status: SHIPPED | OUTPATIENT
Start: 2025-05-13

## 2025-05-13 RX ORDER — CLONAZEPAM 0.5 MG/1
0.5 TABLET ORAL
Qty: 30 TABLET | Refills: 0 | Status: SHIPPED | OUTPATIENT
Start: 2025-06-14 | End: 2025-07-14

## 2025-05-13 RX ORDER — GABAPENTIN 600 MG/1
600 TABLET ORAL
Qty: 30 TABLET | Refills: 2 | Status: SHIPPED | OUTPATIENT
Start: 2025-05-13 | End: 2025-08-11

## 2025-05-13 RX ORDER — OLANZAPINE 2.5 MG/1
2.5 TABLET, FILM COATED ORAL NIGHTLY
Qty: 30 TABLET | Refills: 2 | Status: SHIPPED | OUTPATIENT
Start: 2025-05-13 | End: 2025-08-11

## 2025-05-13 RX ORDER — VENLAFAXINE HYDROCHLORIDE 150 MG/1
300 CAPSULE, EXTENDED RELEASE ORAL DAILY
Qty: 60 CAPSULE | Refills: 2 | Status: SHIPPED | OUTPATIENT
Start: 2025-05-13 | End: 2025-08-11

## 2025-05-13 ASSESSMENT — ENCOUNTER SYMPTOMS
SEIZURES: 0
HALLUCINATIONS: 0
BLOOD IN STOOL: 0
SHORTNESS OF BREATH: 0
PALPITATIONS: 0
FEVER: 0
WHEEZING: 0
BLURRED VISION: 0
FALLS: 0

## 2025-05-13 ASSESSMENT — FIBROSIS 4 INDEX: FIB4 SCORE: 1.38

## 2025-05-13 ASSESSMENT — LIFESTYLE VARIABLES: SUBSTANCE_ABUSE: 0
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
## 2025-05-16 NOTE — ASSESSMENT & PLAN NOTE
Problem type: Chronic Illness, Stable    Assessment: See under borderline personality disorder for integrated and synthesized assessmenet    Plan  Medication:   - Continue gabapentin 600mg per evening for depression and prevention of anxiety  - Continue mirtazapine 45mg per evening for depression  - Continue Effexor XR 300mg per day for depression  - Continue  clonazepam to up to 0.5mg per day as needed for anxiety  - Continue olanzapine 2.5mg per evening for depression    Therapy: Mrs. Marroquin reports she is engaged in therapy    Other Orders: Labs described above under anorexia nervosa
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
## 2025-06-10 ENCOUNTER — OFFICE VISIT (OUTPATIENT)
Dept: BEHAVIORAL HEALTH | Facility: PSYCHIATRIC FACILITY | Age: 60
End: 2025-06-10
Payer: COMMERCIAL

## 2025-06-10 VITALS
SYSTOLIC BLOOD PRESSURE: 114 MMHG | DIASTOLIC BLOOD PRESSURE: 70 MMHG | OXYGEN SATURATION: 97 % | BODY MASS INDEX: 18.2 KG/M2 | HEIGHT: 64 IN | WEIGHT: 106.6 LBS | HEART RATE: 75 BPM

## 2025-06-10 DIAGNOSIS — F60.3 BORDERLINE PERSONALITY DISORDER (HCC): Primary | ICD-10-CM

## 2025-06-10 DIAGNOSIS — F32.A ANXIETY AND DEPRESSION: ICD-10-CM

## 2025-06-10 DIAGNOSIS — F41.9 ANXIETY DISORDER, UNSPECIFIED TYPE: ICD-10-CM

## 2025-06-10 DIAGNOSIS — F50.029 ANOREXIA NERVOSA, BINGE-EATING PURGING TYPE, UNSPECIFIED SEVERITY: ICD-10-CM

## 2025-06-10 DIAGNOSIS — F41.9 ANXIETY AND DEPRESSION: ICD-10-CM

## 2025-06-10 PROCEDURE — 3078F DIAST BP <80 MM HG: CPT

## 2025-06-10 PROCEDURE — 99214 OFFICE O/P EST MOD 30 MIN: CPT | Mod: GC

## 2025-06-10 PROCEDURE — 3074F SYST BP LT 130 MM HG: CPT

## 2025-06-10 RX ORDER — MIRTAZAPINE 45 MG/1
45 TABLET, FILM COATED ORAL
Qty: 30 TABLET | Refills: 3 | Status: SHIPPED | OUTPATIENT
Start: 2025-06-10

## 2025-06-10 RX ORDER — OLANZAPINE 2.5 MG/1
2.5 TABLET, FILM COATED ORAL NIGHTLY
Qty: 30 TABLET | Refills: 2 | Status: SHIPPED | OUTPATIENT
Start: 2025-06-10 | End: 2025-09-08

## 2025-06-10 RX ORDER — CLONAZEPAM 0.5 MG/1
0.5 TABLET ORAL
Qty: 30 TABLET | Refills: 0 | Status: SHIPPED | OUTPATIENT
Start: 2025-07-14 | End: 2025-08-13

## 2025-06-10 RX ORDER — GABAPENTIN 600 MG/1
600 TABLET ORAL
Qty: 30 TABLET | Refills: 2 | Status: SHIPPED | OUTPATIENT
Start: 2025-06-10 | End: 2025-09-08

## 2025-06-10 ASSESSMENT — ENCOUNTER SYMPTOMS
SEIZURES: 0
FEVER: 0
BLOOD IN STOOL: 0
WHEEZING: 0
FALLS: 0
BLURRED VISION: 0

## 2025-06-10 ASSESSMENT — LIFESTYLE VARIABLES: SUBSTANCE_ABUSE: 0

## 2025-06-10 ASSESSMENT — FIBROSIS 4 INDEX: FIB4 SCORE: 1.38
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
## 2025-06-27 ENCOUNTER — OFFICE VISIT (OUTPATIENT)
Dept: MEDICAL GROUP | Facility: PHYSICIAN GROUP | Age: 60
End: 2025-06-27
Payer: COMMERCIAL

## 2025-06-27 VITALS
RESPIRATION RATE: 16 BRPM | HEART RATE: 81 BPM | SYSTOLIC BLOOD PRESSURE: 110 MMHG | OXYGEN SATURATION: 97 % | WEIGHT: 105.38 LBS | HEIGHT: 64 IN | TEMPERATURE: 98.2 F | BODY MASS INDEX: 17.99 KG/M2 | DIASTOLIC BLOOD PRESSURE: 64 MMHG

## 2025-06-27 DIAGNOSIS — T16.2XXA FOREIGN BODY OF LEFT EAR, INITIAL ENCOUNTER: Primary | ICD-10-CM

## 2025-06-27 PROCEDURE — 3074F SYST BP LT 130 MM HG: CPT | Performed by: STUDENT IN AN ORGANIZED HEALTH CARE EDUCATION/TRAINING PROGRAM

## 2025-06-27 PROCEDURE — 99213 OFFICE O/P EST LOW 20 MIN: CPT | Performed by: STUDENT IN AN ORGANIZED HEALTH CARE EDUCATION/TRAINING PROGRAM

## 2025-06-27 PROCEDURE — 3078F DIAST BP <80 MM HG: CPT | Performed by: STUDENT IN AN ORGANIZED HEALTH CARE EDUCATION/TRAINING PROGRAM

## 2025-06-27 ASSESSMENT — FIBROSIS 4 INDEX: FIB4 SCORE: 1.38
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
## 2025-08-03 DIAGNOSIS — F50.029 ANOREXIA NERVOSA, BINGE-EATING PURGING TYPE: ICD-10-CM

## 2025-08-08 ENCOUNTER — OFFICE VISIT (OUTPATIENT)
Dept: BEHAVIORAL HEALTH | Facility: PSYCHIATRIC FACILITY | Age: 60
End: 2025-08-08
Payer: COMMERCIAL

## 2025-08-08 VITALS
OXYGEN SATURATION: 99 % | HEIGHT: 64 IN | DIASTOLIC BLOOD PRESSURE: 73 MMHG | HEART RATE: 74 BPM | WEIGHT: 104.8 LBS | BODY MASS INDEX: 17.89 KG/M2 | SYSTOLIC BLOOD PRESSURE: 123 MMHG

## 2025-08-08 DIAGNOSIS — F41.9 ANXIETY AND DEPRESSION: Primary | ICD-10-CM

## 2025-08-08 DIAGNOSIS — F60.3 BORDERLINE PERSONALITY DISORDER (HCC): ICD-10-CM

## 2025-08-08 DIAGNOSIS — F32.A ANXIETY AND DEPRESSION: Primary | ICD-10-CM

## 2025-08-08 DIAGNOSIS — F41.9 ANXIETY DISORDER, UNSPECIFIED TYPE: ICD-10-CM

## 2025-08-08 DIAGNOSIS — F50.029 ANOREXIA NERVOSA, BINGE-EATING PURGING TYPE, UNSPECIFIED SEVERITY: ICD-10-CM

## 2025-08-08 PROCEDURE — 99999 PR NO CHARGE: CPT

## 2025-08-08 RX ORDER — CLONAZEPAM 0.5 MG/1
0.5 TABLET ORAL
Qty: 30 TABLET | Refills: 0 | Status: SHIPPED | OUTPATIENT
Start: 2025-08-15 | End: 2025-09-14

## 2025-08-08 RX ORDER — OLANZAPINE 2.5 MG/1
2.5 TABLET, FILM COATED ORAL NIGHTLY
Qty: 30 TABLET | Refills: 2 | Status: SHIPPED | OUTPATIENT
Start: 2025-08-08 | End: 2025-11-06

## 2025-08-08 RX ORDER — GABAPENTIN 600 MG/1
600 TABLET ORAL
Qty: 30 TABLET | Refills: 2 | Status: SHIPPED | OUTPATIENT
Start: 2025-08-08 | End: 2025-11-06

## 2025-08-08 RX ORDER — MIRTAZAPINE 45 MG/1
45 TABLET, FILM COATED ORAL
Qty: 30 TABLET | Refills: 3 | Status: SHIPPED | OUTPATIENT
Start: 2025-08-08

## 2025-08-08 RX ORDER — VENLAFAXINE HYDROCHLORIDE 150 MG/1
300 CAPSULE, EXTENDED RELEASE ORAL DAILY
Qty: 60 CAPSULE | Refills: 0 | Status: SHIPPED | OUTPATIENT
Start: 2025-08-08

## 2025-08-08 ASSESSMENT — ENCOUNTER SYMPTOMS
BLURRED VISION: 0
SEIZURES: 0
BLOOD IN STOOL: 0
FALLS: 0
FEVER: 0
WHEEZING: 0

## 2025-08-08 ASSESSMENT — LIFESTYLE VARIABLES: SUBSTANCE_ABUSE: 0

## 2025-08-08 ASSESSMENT — FIBROSIS 4 INDEX: FIB4 SCORE: 1.38

## 2025-08-29 ENCOUNTER — APPOINTMENT (OUTPATIENT)
Dept: BEHAVIORAL HEALTH | Facility: PSYCHIATRIC FACILITY | Age: 60
End: 2025-08-29
Payer: COMMERCIAL

## 2025-09-11 ENCOUNTER — APPOINTMENT (OUTPATIENT)
Dept: MEDICAL GROUP | Facility: PHYSICIAN GROUP | Age: 60
End: 2025-09-11
Payer: COMMERCIAL